# Patient Record
Sex: FEMALE | Race: WHITE | NOT HISPANIC OR LATINO | Employment: OTHER | URBAN - METROPOLITAN AREA
[De-identification: names, ages, dates, MRNs, and addresses within clinical notes are randomized per-mention and may not be internally consistent; named-entity substitution may affect disease eponyms.]

---

## 2018-06-14 ENCOUNTER — OFFICE VISIT (OUTPATIENT)
Dept: ORTHOPEDIC SURGERY | Facility: CLINIC | Age: 43
End: 2018-06-14

## 2018-06-14 VITALS
HEART RATE: 69 BPM | HEIGHT: 65 IN | SYSTOLIC BLOOD PRESSURE: 109 MMHG | WEIGHT: 160 LBS | BODY MASS INDEX: 26.66 KG/M2 | DIASTOLIC BLOOD PRESSURE: 77 MMHG

## 2018-06-14 DIAGNOSIS — M79.18 MYOFASCIAL PAIN: ICD-10-CM

## 2018-06-14 DIAGNOSIS — M54.2 CERVICALGIA: Primary | ICD-10-CM

## 2018-06-14 PROCEDURE — 99203 OFFICE O/P NEW LOW 30 MIN: CPT | Performed by: ORTHOPAEDIC SURGERY

## 2018-06-14 RX ORDER — OMEGA-3S/DHA/EPA/FISH OIL/D3 300MG-1000
CAPSULE ORAL
COMMUNITY
Start: 2018-06-01 | End: 2021-11-15

## 2018-06-14 RX ORDER — ERGOCALCIFEROL 1.25 MG/1
CAPSULE ORAL
COMMUNITY
Start: 2018-06-01

## 2018-06-14 RX ORDER — DICLOFENAC SODIUM 75 MG/1
75 TABLET, DELAYED RELEASE ORAL 2 TIMES DAILY
Qty: 30 TABLET | Refills: 0 | Status: SHIPPED | OUTPATIENT
Start: 2018-06-14 | End: 2021-11-15

## 2018-06-14 RX ORDER — ROPINIROLE 2 MG/1
TABLET, FILM COATED ORAL
COMMUNITY
Start: 2018-05-30

## 2018-06-14 RX ORDER — ATORVASTATIN CALCIUM 40 MG/1
TABLET, FILM COATED ORAL
COMMUNITY
Start: 2018-06-01 | End: 2021-11-15

## 2018-07-02 PROBLEM — M79.18 MYOFASCIAL PAIN: Status: ACTIVE | Noted: 2018-07-02

## 2021-11-15 ENCOUNTER — PATIENT ROUNDING (BHMG ONLY) (OUTPATIENT)
Dept: SURGERY | Facility: CLINIC | Age: 46
End: 2021-11-15

## 2021-11-15 ENCOUNTER — OFFICE VISIT (OUTPATIENT)
Dept: SURGERY | Facility: CLINIC | Age: 46
End: 2021-11-15

## 2021-11-15 VITALS
RESPIRATION RATE: 16 BRPM | HEART RATE: 72 BPM | DIASTOLIC BLOOD PRESSURE: 74 MMHG | BODY MASS INDEX: 26.66 KG/M2 | HEIGHT: 65 IN | SYSTOLIC BLOOD PRESSURE: 122 MMHG | WEIGHT: 160 LBS

## 2021-11-15 DIAGNOSIS — Z86.010 HISTORY OF COLON POLYPS: Primary | ICD-10-CM

## 2021-11-15 DIAGNOSIS — Z72.0 TOBACCO ABUSE: ICD-10-CM

## 2021-11-15 PROCEDURE — S0285 CNSLT BEFORE SCREEN COLONOSC: HCPCS | Performed by: SURGERY

## 2021-11-15 NOTE — PROGRESS NOTES
November 15, 2021    Hello, may I speak with Triny Alexandra?    My name is Sammie Alvarez      I am  with [facility]  [address] [place] [address] [phone].    Before we get started may I verify your date of birth? 1975    I am calling to officially welcome you to our practice and ask about your recent visit. Is this a good time to talk? yes    Tell me about your visit with us. What things went well?  Everything was fine.       We're always looking for ways to make our patients' experiences even better. Do you have recommendations on ways we may improve?  no    Overall were you satisfied with your first visit to our practice? yes       I appreciate you taking the time to speak with me today. Is there anything else I can do for you? no      Thank you, and have a great day.

## 2021-11-15 NOTE — PROGRESS NOTES
Triny Alexandra 46 y.o. female presents TO DISCUSS 5 YR REPEAT C-SCOPE.  + personal H/O polyps.    Chief Complaint   Patient presents with   • Colonoscopy             HPI     Above-noted agree.  Triny is known to my service.  She is here for her 5-year repeat colonoscopy.  She is doing very well.  She is tolerating a regular diet without nausea or vomiting.  She is moving her bowels well.  She has no complaints.  She is still smoking cigarettes.    Review of Systems   All other systems reviewed and are negative.            Current Outpatient Medications:   •  HYDROcodone-acetaminophen (NORCO) 7.5-325 MG per tablet, , Disp: , Rfl:   •  rOPINIRole (REQUIP) 2 MG tablet, , Disp: , Rfl:   •  vitamin D (ERGOCALCIFEROL) 05877 units capsule capsule, , Disp: , Rfl:         Allergies   Allergen Reactions   • Lyrica [Pregabalin] Shortness Of Breath   • Imitrex [Sumatriptan]    • Neurontin [Gabapentin]    • Pamelor [Nortriptyline Hcl]            Past Medical History:   Diagnosis Date   • Anxiety    • Cervicalgia    • Depression    • Hyperlipidemia    • Insomnia    • RLS (restless legs syndrome)            Past Surgical History:   Procedure Laterality Date   • COLONOSCOPY N/A 6/21/2016    Procedure: COLONOSCOPY; POLYPECTOMY;  Surgeon: Perla Garces DO;  Location: [facility];  Service:    • HYSTERECTOMY     • LAPAROSCOPIC CHOLECYSTECTOMY     • WRIST SURGERY             Social History     Tobacco Use   • Smoking status: Current Every Day Smoker     Packs/day: 1.00     Years: 15.00     Pack years: 15.00   • Smokeless tobacco: Not on file   Substance Use Topics   • Alcohol use: No   • Drug use: No           Immunization History   Administered Date(s) Administered   • COVID-19 ([employer]) 04/29/2021           Physical Exam  Vitals and nursing note reviewed.   Constitutional:       Appearance: Normal appearance.   HENT:      Head: Normocephalic and atraumatic.   Cardiovascular:      Rate and Rhythm: Normal rate and regular  "rhythm.   Pulmonary:      Effort: Pulmonary effort is normal.      Breath sounds: Normal breath sounds.   Abdominal:      General: Bowel sounds are normal.      Palpations: Abdomen is soft.   Musculoskeletal:         General: No swelling or tenderness.   Skin:     General: Skin is warm and dry.   Neurological:      General: No focal deficit present.      Mental Status: She is alert and oriented to person, place, and time.   Psychiatric:         Mood and Affect: Mood normal.         Debilities/Disabilities Identified: None    Emotional Behavior: Appropriate      /74   Pulse 72   Resp 16   Ht 165.1 cm (65\")   Wt 72.6 kg (160 lb)   BMI 26.63 kg/m²         Diagnoses and all orders for this visit:    1. History of colon polyps (Primary)    2. Tobacco abuse    I encouraged tobacco cessation.  We discussed the benefits and risks of performing endoscopy.  Benefits and risks were not limited to but including bleeding, infection, perforation, complications of anesthesia, aspiration.  Triny appeared to understand and is willing to proceed.    Thank you for allowing me to participate in the care of this interesting patient.        "

## 2021-11-15 NOTE — H&P
Triny Alexandra 46 y.o. female presents TO DISCUSS 5 YR REPEAT C-SCOPE.  + personal H/O polyps.    Chief Complaint   Patient presents with   • Colonoscopy             HPI     Above-noted agree.  Triny is known to my service.  She is here for her 5-year repeat colonoscopy.  She is doing very well.  She is tolerating a regular diet without nausea or vomiting.  She is moving her bowels well.  She has no complaints.  She is still smoking cigarettes.    Review of Systems   All other systems reviewed and are negative.            Current Outpatient Medications:   •  HYDROcodone-acetaminophen (NORCO) 7.5-325 MG per tablet, , Disp: , Rfl:   •  rOPINIRole (REQUIP) 2 MG tablet, , Disp: , Rfl:   •  vitamin D (ERGOCALCIFEROL) 69200 units capsule capsule, , Disp: , Rfl:         Allergies   Allergen Reactions   • Lyrica [Pregabalin] Shortness Of Breath   • Imitrex [Sumatriptan]    • Neurontin [Gabapentin]    • Pamelor [Nortriptyline Hcl]            Past Medical History:   Diagnosis Date   • Anxiety    • Cervicalgia    • Depression    • Hyperlipidemia    • Insomnia    • RLS (restless legs syndrome)            Past Surgical History:   Procedure Laterality Date   • COLONOSCOPY N/A 6/21/2016    Procedure: COLONOSCOPY; POLYPECTOMY;  Surgeon: Perla Garces DO;  Location: [facility];  Service:    • HYSTERECTOMY     • LAPAROSCOPIC CHOLECYSTECTOMY     • WRIST SURGERY             Social History     Tobacco Use   • Smoking status: Current Every Day Smoker     Packs/day: 1.00     Years: 15.00     Pack years: 15.00   • Smokeless tobacco: Not on file   Substance Use Topics   • Alcohol use: No   • Drug use: No           Immunization History   Administered Date(s) Administered   • COVID-19 ([employer]) 04/29/2021           Physical Exam  Vitals and nursing note reviewed.   Constitutional:       Appearance: Normal appearance.   HENT:      Head: Normocephalic and atraumatic.   Cardiovascular:      Rate and Rhythm: Normal rate and regular  "rhythm.   Pulmonary:      Effort: Pulmonary effort is normal.      Breath sounds: Normal breath sounds.   Abdominal:      General: Bowel sounds are normal.      Palpations: Abdomen is soft.   Musculoskeletal:         General: No swelling or tenderness.   Skin:     General: Skin is warm and dry.   Neurological:      General: No focal deficit present.      Mental Status: She is alert and oriented to person, place, and time.   Psychiatric:         Mood and Affect: Mood normal.         Debilities/Disabilities Identified: None    Emotional Behavior: Appropriate      /74   Pulse 72   Resp 16   Ht 165.1 cm (65\")   Wt 72.6 kg (160 lb)   BMI 26.63 kg/m²         Diagnoses and all orders for this visit:    1. History of colon polyps (Primary)    2. Tobacco abuse    I encouraged tobacco cessation.  We discussed the benefits and risks of performing endoscopy.  Benefits and risks were not limited to but including bleeding, infection, perforation, complications of anesthesia, aspiration.  Triny appeared to understand and is willing to proceed.    Thank you for allowing me to participate in the care of this interesting patient.            "

## 2021-11-16 PROBLEM — Z72.0 TOBACCO ABUSE: Status: ACTIVE | Noted: 2021-11-16

## 2021-11-16 PROBLEM — Z86.010 HISTORY OF COLON POLYPS: Status: ACTIVE | Noted: 2021-11-16

## 2021-12-09 DIAGNOSIS — Z01.818 PRE-OP TESTING: Primary | ICD-10-CM

## 2021-12-11 ENCOUNTER — LAB (OUTPATIENT)
Dept: LAB | Facility: HOSPITAL | Age: 46
End: 2021-12-11

## 2021-12-11 DIAGNOSIS — Z01.818 PRE-OP TESTING: ICD-10-CM

## 2021-12-11 LAB — SARS-COV-2 RNA PNL SPEC NAA+PROBE: NOT DETECTED

## 2021-12-11 PROCEDURE — C9803 HOPD COVID-19 SPEC COLLECT: HCPCS

## 2021-12-11 PROCEDURE — 87635 SARS-COV-2 COVID-19 AMP PRB: CPT | Performed by: SURGERY

## 2021-12-13 ENCOUNTER — ANESTHESIA EVENT (OUTPATIENT)
Dept: PERIOP | Facility: HOSPITAL | Age: 46
End: 2021-12-13

## 2021-12-14 ENCOUNTER — HOSPITAL ENCOUNTER (OUTPATIENT)
Facility: HOSPITAL | Age: 46
Setting detail: HOSPITAL OUTPATIENT SURGERY
Discharge: HOME | End: 2021-12-14
Attending: SURGERY | Admitting: SURGERY

## 2021-12-14 ENCOUNTER — ANESTHESIA (OUTPATIENT)
Dept: PERIOP | Facility: HOSPITAL | Age: 46
End: 2021-12-14

## 2021-12-14 VITALS
SYSTOLIC BLOOD PRESSURE: 152 MMHG | RESPIRATION RATE: 16 BRPM | TEMPERATURE: 98.3 F | BODY MASS INDEX: 27.92 KG/M2 | OXYGEN SATURATION: 97 % | WEIGHT: 167.8 LBS | DIASTOLIC BLOOD PRESSURE: 98 MMHG | HEART RATE: 70 BPM

## 2021-12-14 DIAGNOSIS — Z72.0 TOBACCO ABUSE: ICD-10-CM

## 2021-12-14 DIAGNOSIS — Z86.010 HISTORY OF COLON POLYPS: ICD-10-CM

## 2021-12-14 PROCEDURE — 88305 TISSUE EXAM BY PATHOLOGIST: CPT | Performed by: SURGERY

## 2021-12-14 PROCEDURE — 45380 COLONOSCOPY AND BIOPSY: CPT | Performed by: SURGERY

## 2021-12-14 PROCEDURE — 25010000002 PROPOFOL 10 MG/ML EMULSION: Performed by: REGISTERED NURSE

## 2021-12-14 RX ORDER — ONDANSETRON 2 MG/ML
4 INJECTION INTRAMUSCULAR; INTRAVENOUS ONCE AS NEEDED
Status: DISCONTINUED | OUTPATIENT
Start: 2021-12-14 | End: 2021-12-14 | Stop reason: HOSPADM

## 2021-12-14 RX ORDER — SODIUM CHLORIDE, SODIUM LACTATE, POTASSIUM CHLORIDE, CALCIUM CHLORIDE 600; 310; 30; 20 MG/100ML; MG/100ML; MG/100ML; MG/100ML
100 INJECTION, SOLUTION INTRAVENOUS CONTINUOUS
Status: DISCONTINUED | OUTPATIENT
Start: 2021-12-14 | End: 2021-12-14 | Stop reason: HOSPADM

## 2021-12-14 RX ORDER — LIDOCAINE HYDROCHLORIDE 20 MG/ML
INJECTION, SOLUTION INFILTRATION; PERINEURAL AS NEEDED
Status: DISCONTINUED | OUTPATIENT
Start: 2021-12-14 | End: 2021-12-14 | Stop reason: SURG

## 2021-12-14 RX ORDER — LIDOCAINE HYDROCHLORIDE 10 MG/ML
0.5 INJECTION, SOLUTION EPIDURAL; INFILTRATION; INTRACAUDAL; PERINEURAL ONCE AS NEEDED
Status: DISCONTINUED | OUTPATIENT
Start: 2021-12-14 | End: 2021-12-14 | Stop reason: HOSPADM

## 2021-12-14 RX ORDER — SODIUM CHLORIDE 0.9 % (FLUSH) 0.9 %
10 SYRINGE (ML) INJECTION EVERY 12 HOURS SCHEDULED
Status: DISCONTINUED | OUTPATIENT
Start: 2021-12-14 | End: 2021-12-14 | Stop reason: HOSPADM

## 2021-12-14 RX ORDER — SODIUM CHLORIDE, SODIUM LACTATE, POTASSIUM CHLORIDE, CALCIUM CHLORIDE 600; 310; 30; 20 MG/100ML; MG/100ML; MG/100ML; MG/100ML
9 INJECTION, SOLUTION INTRAVENOUS CONTINUOUS
Status: DISCONTINUED | OUTPATIENT
Start: 2021-12-14 | End: 2021-12-14 | Stop reason: HOSPADM

## 2021-12-14 RX ORDER — DEXMEDETOMIDINE HYDROCHLORIDE 100 UG/ML
INJECTION, SOLUTION INTRAVENOUS AS NEEDED
Status: DISCONTINUED | OUTPATIENT
Start: 2021-12-14 | End: 2021-12-14 | Stop reason: SURG

## 2021-12-14 RX ORDER — SODIUM CHLORIDE 0.9 % (FLUSH) 0.9 %
10 SYRINGE (ML) INJECTION AS NEEDED
Status: DISCONTINUED | OUTPATIENT
Start: 2021-12-14 | End: 2021-12-14 | Stop reason: HOSPADM

## 2021-12-14 RX ORDER — SODIUM CHLORIDE 9 MG/ML
40 INJECTION, SOLUTION INTRAVENOUS AS NEEDED
Status: DISCONTINUED | OUTPATIENT
Start: 2021-12-14 | End: 2021-12-14 | Stop reason: HOSPADM

## 2021-12-14 RX ORDER — PROPOFOL 10 MG/ML
VIAL (ML) INTRAVENOUS AS NEEDED
Status: DISCONTINUED | OUTPATIENT
Start: 2021-12-14 | End: 2021-12-14 | Stop reason: SURG

## 2021-12-14 RX ORDER — MAGNESIUM HYDROXIDE 1200 MG/15ML
LIQUID ORAL AS NEEDED
Status: DISCONTINUED | OUTPATIENT
Start: 2021-12-14 | End: 2021-12-14 | Stop reason: HOSPADM

## 2021-12-14 RX ADMIN — PROPOFOL 100 MCG/KG/MIN: 10 INJECTION, EMULSION INTRAVENOUS at 09:37

## 2021-12-14 RX ADMIN — DEXMEDETOMIDINE 4 MCG: 100 INJECTION, SOLUTION, CONCENTRATE INTRAVENOUS at 09:44

## 2021-12-14 RX ADMIN — DEXMEDETOMIDINE 4 MCG: 100 INJECTION, SOLUTION, CONCENTRATE INTRAVENOUS at 09:37

## 2021-12-14 RX ADMIN — SODIUM CHLORIDE, POTASSIUM CHLORIDE, SODIUM LACTATE AND CALCIUM CHLORIDE 9 ML/HR: 600; 310; 30; 20 INJECTION, SOLUTION INTRAVENOUS at 08:50

## 2021-12-14 RX ADMIN — LIDOCAINE HYDROCHLORIDE 50 MG: 20 INJECTION, SOLUTION INFILTRATION; PERINEURAL at 09:37

## 2021-12-14 NOTE — ANESTHESIA PREPROCEDURE EVALUATION
Anesthesia Evaluation     Patient summary reviewed and Nursing notes reviewed   no history of anesthetic complications:  NPO Solid Status: > 8 hours  NPO Liquid Status: > 8 hours           Airway   Mallampati: I  TM distance: >3 FB  Neck ROM: full  No difficulty expected  Dental - normal exam     Pulmonary - normal exam    breath sounds clear to auscultation  (+) a smoker (up to one pack per day) Current Smoked day of surgery,   Cardiovascular - normal exam  Exercise tolerance: good (4-7 METS)    Rhythm: regular  Rate: normal        Neuro/Psych  (+) headaches (migraines),       ROS Comment: RLS, also toes draw up  GI/Hepatic/Renal/Endo    (+)   renal disease (remotely) stones,   PUD: occ.    Musculoskeletal     (+) neck pain (with pain into left shoulder), radiculopathy Left upper extremity  Abdominal  - normal exam   Substance History - negative use     OB/GYN negative ob/gyn ROS         Other - negative ROS                       Anesthesia Plan    ASA 2     MAC     intravenous induction     Anesthetic plan, all risks, benefits, and alternatives have been provided, discussed and informed consent has been obtained with: patient and sibling.

## 2021-12-14 NOTE — OP NOTE
Colonoscopy Procedure Note      Pre-operative Diagnosis: History of colon polyps    Post-operative Diagnosis: Rectal polyps    Procedure: Colonoscopy with polypectomy using cold forceps    Surgeon: Mili    Anesthetic: Joey Mantilla CRNA    Estimated Blood Loss: Minimal    Complications: None    Indications: History of colon polyps    Findings/Treatments:   Rectal polyps x6-removed with cold forceps       Scope Withdrawal Time:  6 minutes      Recommendations: Await pathology      Procedure Details     After discussing the benefits and risks of the procedure with the patient, not limited to but including:  Bleeding, infection, perforation, aspiration; informed consent was signed.  The patient was taken into the endoscopy room at [facility] and placed in the left lateral decubitus position.  MAC anesthesia was given with appropriate cardiopulmonary monitoring.  A rectal exam was performed.  Sphincter tone was normal.  The colonoscope was then inserted and carefully advanced to the cecum while visualizing the mucosa.  The cecum was identified by the ileocecal valve and the orifice of the appendix.  Once in the cecum, the scope was slowly withdrawn while carefully evaluating the mucosa deflating air.  Once in the rectum there were multiple rectal polyps removed cold forceps.  The scope was then retroflexed and straightened and removed.  Triny was then taken to the recovery area in stable postoperative condition having tolerated her procedure well.    Perla Garces DO

## 2021-12-14 NOTE — H&P
Triny Alexandra 46 y.o. female presents TO DISCUSS 5 YR REPEAT C-SCOPE.  + personal H/O polyps.    Chief Complaint   Patient presents with   • Colonoscopy             HPI     Above-noted agree.  Triny is known to my service.  She is here for her 5-year repeat colonoscopy.  She is doing very well.  She is tolerating a regular diet without nausea or vomiting.  She is moving her bowels well.  She has no complaints.  She is still smoking cigarettes.    Review of Systems   All other systems reviewed and are negative.            Current Outpatient Medications:   •  HYDROcodone-acetaminophen (NORCO) 7.5-325 MG per tablet, , Disp: , Rfl:   •  rOPINIRole (REQUIP) 2 MG tablet, , Disp: , Rfl:   •  vitamin D (ERGOCALCIFEROL) 06573 units capsule capsule, , Disp: , Rfl:         Allergies   Allergen Reactions   • Lyrica [Pregabalin] Shortness Of Breath   • Imitrex [Sumatriptan]    • Neurontin [Gabapentin]    • Pamelor [Nortriptyline Hcl]            Past Medical History:   Diagnosis Date   • Anxiety    • Cervicalgia    • Depression    • Hyperlipidemia    • Insomnia    • RLS (restless legs syndrome)            Past Surgical History:   Procedure Laterality Date   • COLONOSCOPY N/A 6/21/2016    Procedure: COLONOSCOPY; POLYPECTOMY;  Surgeon: Perla Garces DO;  Location: [facility];  Service:    • HYSTERECTOMY     • LAPAROSCOPIC CHOLECYSTECTOMY     • WRIST SURGERY             Social History     Tobacco Use   • Smoking status: Current Every Day Smoker     Packs/day: 1.00     Years: 15.00     Pack years: 15.00   • Smokeless tobacco: Not on file   Substance Use Topics   • Alcohol use: No   • Drug use: No           Immunization History   Administered Date(s) Administered   • COVID-19 ([employer]) 04/29/2021           Physical Exam  Vitals and nursing note reviewed.   Constitutional:       Appearance: Normal appearance.   HENT:      Head: Normocephalic and atraumatic.   Cardiovascular:      Rate and Rhythm: Normal rate and regular  "rhythm.   Pulmonary:      Effort: Pulmonary effort is normal.      Breath sounds: Normal breath sounds.   Abdominal:      General: Bowel sounds are normal.      Palpations: Abdomen is soft.   Musculoskeletal:         General: No swelling or tenderness.   Skin:     General: Skin is warm and dry.   Neurological:      General: No focal deficit present.      Mental Status: She is alert and oriented to person, place, and time.   Psychiatric:         Mood and Affect: Mood normal.         Debilities/Disabilities Identified: None    Emotional Behavior: Appropriate      /74   Pulse 72   Resp 16   Ht 165.1 cm (65\")   Wt 72.6 kg (160 lb)   BMI 26.63 kg/m²         Diagnoses and all orders for this visit:    1. History of colon polyps (Primary)    2. Tobacco abuse    I encouraged tobacco cessation.  We discussed the benefits and risks of performing endoscopy.  Benefits and risks were not limited to but including bleeding, infection, perforation, complications of anesthesia, aspiration.  Triny appeared to understand and is willing to proceed.    Thank you for allowing me to participate in the care of this interesting patient.            "

## 2021-12-14 NOTE — ANESTHESIA POSTPROCEDURE EVALUATION
Patient: Triny Alexandra    Procedure Summary     Date: 12/14/21 Room / Location: [facility] ENDOSCOPY 1 /  [facility]    Anesthesia Start: 0934 Anesthesia Stop:     Procedure: COLONOSCOPY with polypectomy (N/A ) Diagnosis:       History of colon polyps      Tobacco abuse      (History of colon polyps [Z86.010])      (Tobacco abuse [Z72.0])    Surgeons: Perla Garces DO Provider: Joey Garrison CRNA    Anesthesia Type: MAC ASA Status: 2          Anesthesia Type: MAC    Vitals  Vitals Value Taken Time   /98 12/14/21 1030   Temp 98.3 °F (36.8 °C) 12/14/21 1010   Pulse 70 12/14/21 1030   Resp 16 12/14/21 1030   SpO2 97 % 12/14/21 1030           Post Anesthesia Care and Evaluation    Patient location during evaluation: PHASE II  Patient participation: complete - patient participated  Level of consciousness: awake  Pain score: 0  Pain management: adequate  Airway patency: patent  Anesthetic complications: No anesthetic complications  PONV Status: none  Cardiovascular status: acceptable  Hydration status: acceptable

## 2021-12-15 ENCOUNTER — HOSPITAL ENCOUNTER (EMERGENCY)
Facility: HOSPITAL | Age: 46
Discharge: HOME | End: 2021-12-16
Attending: EMERGENCY MEDICINE | Admitting: EMERGENCY MEDICINE

## 2021-12-15 ENCOUNTER — APPOINTMENT (OUTPATIENT)
Dept: CT IMAGING | Facility: HOSPITAL | Age: 46
End: 2021-12-15

## 2021-12-15 DIAGNOSIS — R10.32 LEFT LOWER QUADRANT ABDOMINAL PAIN: Primary | ICD-10-CM

## 2021-12-15 LAB
ALBUMIN SERPL-MCNC: 3.6 G/DL (ref 3.5–5.2)
ALBUMIN/GLOB SERPL: 1.3 G/DL
ALP SERPL-CCNC: 94 U/L (ref 39–117)
ALT SERPL W P-5'-P-CCNC: 25 U/L (ref 1–33)
ANION GAP SERPL CALCULATED.3IONS-SCNC: 9.4 MMOL/L (ref 5–15)
AST SERPL-CCNC: 19 U/L (ref 1–32)
BASOPHILS # BLD AUTO: 0.05 10*3/MM3 (ref 0–0.2)
BASOPHILS NFR BLD AUTO: 0.7 % (ref 0–1.5)
BILIRUB SERPL-MCNC: 0.2 MG/DL (ref 0–1.2)
BILIRUB UR QL STRIP: NEGATIVE
BUN SERPL-MCNC: 13 MG/DL (ref 6–20)
BUN/CREAT SERPL: 21.3 (ref 7–25)
CALCIUM SPEC-SCNC: 8.9 MG/DL (ref 8.6–10.5)
CHLORIDE SERPL-SCNC: 104 MMOL/L (ref 98–107)
CLARITY UR: ABNORMAL
CO2 SERPL-SCNC: 23.6 MMOL/L (ref 22–29)
COLOR UR: YELLOW
CREAT SERPL-MCNC: 0.61 MG/DL (ref 0.57–1)
DEPRECATED RDW RBC AUTO: 44.1 FL (ref 37–54)
EOSINOPHIL # BLD AUTO: 0.13 10*3/MM3 (ref 0–0.4)
EOSINOPHIL NFR BLD AUTO: 1.7 % (ref 0.3–6.2)
ERYTHROCYTE [DISTWIDTH] IN BLOOD BY AUTOMATED COUNT: 13.4 % (ref 12.3–15.4)
GFR SERPL CREATININE-BSD FRML MDRD: 105 ML/MIN/1.73
GLOBULIN UR ELPH-MCNC: 2.7 GM/DL
GLUCOSE SERPL-MCNC: 83 MG/DL (ref 65–99)
GLUCOSE UR STRIP-MCNC: NEGATIVE MG/DL
HCT VFR BLD AUTO: 38 % (ref 34–46.6)
HGB BLD-MCNC: 12.7 G/DL (ref 12–15.9)
HGB UR QL STRIP.AUTO: NEGATIVE
IMM GRANULOCYTES # BLD AUTO: 0.02 10*3/MM3 (ref 0–0.05)
IMM GRANULOCYTES NFR BLD AUTO: 0.3 % (ref 0–0.5)
KETONES UR QL STRIP: NEGATIVE
LAB AP CASE REPORT: NORMAL
LEUKOCYTE ESTERASE UR QL STRIP.AUTO: NEGATIVE
LYMPHOCYTES # BLD AUTO: 2.93 10*3/MM3 (ref 0.7–3.1)
LYMPHOCYTES NFR BLD AUTO: 38.8 % (ref 19.6–45.3)
MCH RBC QN AUTO: 30.2 PG (ref 26.6–33)
MCHC RBC AUTO-ENTMCNC: 33.4 G/DL (ref 31.5–35.7)
MCV RBC AUTO: 90.3 FL (ref 79–97)
MONOCYTES # BLD AUTO: 0.51 10*3/MM3 (ref 0.1–0.9)
MONOCYTES NFR BLD AUTO: 6.7 % (ref 5–12)
NEUTROPHILS NFR BLD AUTO: 3.92 10*3/MM3 (ref 1.7–7)
NEUTROPHILS NFR BLD AUTO: 51.8 % (ref 42.7–76)
NITRITE UR QL STRIP: NEGATIVE
NRBC BLD AUTO-RTO: 0 /100 WBC (ref 0–0.2)
PATH REPORT.FINAL DX SPEC: NORMAL
PATH REPORT.GROSS SPEC: NORMAL
PH UR STRIP.AUTO: 7 [PH] (ref 4.5–8)
PLATELET # BLD AUTO: 204 10*3/MM3 (ref 140–450)
PMV BLD AUTO: 11.7 FL (ref 6–12)
POTASSIUM SERPL-SCNC: 3.8 MMOL/L (ref 3.5–5.2)
PROT SERPL-MCNC: 6.3 G/DL (ref 6–8.5)
PROT UR QL STRIP: NEGATIVE
RBC # BLD AUTO: 4.21 10*6/MM3 (ref 3.77–5.28)
SODIUM SERPL-SCNC: 137 MMOL/L (ref 136–145)
SP GR UR STRIP: 1.02 (ref 1–1.03)
UROBILINOGEN UR QL STRIP: ABNORMAL
WBC NRBC COR # BLD: 7.56 10*3/MM3 (ref 3.4–10.8)

## 2021-12-15 PROCEDURE — 96375 TX/PRO/DX INJ NEW DRUG ADDON: CPT

## 2021-12-15 PROCEDURE — 25010000002 FENTANYL CITRATE (PF) 50 MCG/ML SOLUTION: Performed by: EMERGENCY MEDICINE

## 2021-12-15 PROCEDURE — 96374 THER/PROPH/DIAG INJ IV PUSH: CPT

## 2021-12-15 PROCEDURE — 85025 COMPLETE CBC W/AUTO DIFF WBC: CPT | Performed by: EMERGENCY MEDICINE

## 2021-12-15 PROCEDURE — 74177 CT ABD & PELVIS W/CONTRAST: CPT

## 2021-12-15 PROCEDURE — 0 IOPAMIDOL PER 1 ML: Performed by: EMERGENCY MEDICINE

## 2021-12-15 PROCEDURE — 99282 EMERGENCY DEPT VISIT SF MDM: CPT | Performed by: EMERGENCY MEDICINE

## 2021-12-15 PROCEDURE — 81003 URINALYSIS AUTO W/O SCOPE: CPT | Performed by: EMERGENCY MEDICINE

## 2021-12-15 PROCEDURE — 80053 COMPREHEN METABOLIC PANEL: CPT | Performed by: EMERGENCY MEDICINE

## 2021-12-15 PROCEDURE — 25010000002 ONDANSETRON PER 1 MG: Performed by: EMERGENCY MEDICINE

## 2021-12-15 PROCEDURE — 99283 EMERGENCY DEPT VISIT LOW MDM: CPT

## 2021-12-15 RX ORDER — SODIUM CHLORIDE 0.9 % (FLUSH) 0.9 %
10 SYRINGE (ML) INJECTION AS NEEDED
Status: DISCONTINUED | OUTPATIENT
Start: 2021-12-15 | End: 2021-12-16 | Stop reason: HOSPADM

## 2021-12-15 RX ORDER — ONDANSETRON 2 MG/ML
4 INJECTION INTRAMUSCULAR; INTRAVENOUS ONCE
Status: COMPLETED | OUTPATIENT
Start: 2021-12-15 | End: 2021-12-15

## 2021-12-15 RX ORDER — CYCLOBENZAPRINE HCL 10 MG
10 TABLET ORAL 3 TIMES DAILY PRN
COMMUNITY
Start: 2021-11-16

## 2021-12-15 RX ORDER — FENTANYL CITRATE 50 UG/ML
25 INJECTION, SOLUTION INTRAMUSCULAR; INTRAVENOUS ONCE
Status: COMPLETED | OUTPATIENT
Start: 2021-12-15 | End: 2021-12-15

## 2021-12-15 RX ADMIN — FENTANYL CITRATE 25 MCG: 50 INJECTION INTRAMUSCULAR; INTRAVENOUS at 21:50

## 2021-12-15 RX ADMIN — IOPAMIDOL 50 ML: 755 INJECTION, SOLUTION INTRAVENOUS at 22:53

## 2021-12-15 RX ADMIN — ONDANSETRON 4 MG: 2 INJECTION INTRAMUSCULAR; INTRAVENOUS at 21:49

## 2021-12-15 RX ADMIN — SODIUM CHLORIDE, PRESERVATIVE FREE 10 ML: 5 INJECTION INTRAVENOUS at 21:03

## 2021-12-16 VITALS
OXYGEN SATURATION: 98 % | SYSTOLIC BLOOD PRESSURE: 141 MMHG | RESPIRATION RATE: 16 BRPM | TEMPERATURE: 98.2 F | WEIGHT: 168 LBS | DIASTOLIC BLOOD PRESSURE: 84 MMHG | HEART RATE: 70 BPM | HEIGHT: 65 IN | BODY MASS INDEX: 27.99 KG/M2

## 2021-12-16 NOTE — ED NOTES
I wore full protective equipment throughout this patient encounter. Hand hygiene was performed before donning protective equipment and after removal when leaving the room.       Tiffany Coombs RN  12/15/21 9074

## 2021-12-16 NOTE — ED PROVIDER NOTES
Subjective   History of Present Illness  History of Present Illness    Chief complaint: Abdominal pain    Location: Left lower quadrant, nonradiating    Quality/Severity: 6/10 at its worst, 6/10 currently, sharp    Timing/Onset/Duration: Started today    Modifying Factors: It is better when she presses and on it, worse when she lets up    Associated Symptoms: No headache.  No fever chills or cough.  No sore throat earache or nasal congestion.  No chest pain or shortness of breath.  No diarrhea.  The patient's has nausea but no vomiting.  The patient is passing gas.  The patient has not had a bowel movement since her colonoscopy.    Narrative: This 46-year-old white female had a colonoscopy by Dr. Delgado not yesterday.  She presents with left lower quadrant abdominal pain.  She states that there were 6 polyps removed.  The patient has a history of colon polyps removed 5 years ago that were benign.  The patient has had a left oophorectomy and hysterectomy.    PCP:Jose Martin      Review of Systems   Constitutional: Negative for chills and fever.   HENT: Negative for congestion, ear pain and sore throat.    Respiratory: Negative for shortness of breath.    Cardiovascular: Negative for chest pain.   Gastrointestinal: Positive for abdominal pain and nausea. Negative for diarrhea and vomiting.   Genitourinary: Negative for dysuria.   Skin: Negative for rash.   Neurological: Negative for headaches.        Medication List      ASK your doctor about these medications    HYDROcodone-acetaminophen 7.5-325 MG per tablet  Commonly known as: NORCO     rOPINIRole 2 MG tablet  Commonly known as: REQUIP     vitamin D 1.25 MG (36079 UT) capsule capsule  Commonly known as: ERGOCALCIFEROL            Past Medical History:   Diagnosis Date   • Anxiety    • Cervicalgia    • Depression    • Hyperlipidemia    • Insomnia    • RLS (restless legs syndrome)        Allergies   Allergen Reactions   • Lyrica [Pregabalin] Shortness Of Breath   • Imitrex  [Sumatriptan]    • Neurontin [Gabapentin]    • Pamelor [Nortriptyline Hcl]        Past Surgical History:   Procedure Laterality Date   • COLONOSCOPY N/A 6/21/2016    Procedure: COLONOSCOPY; POLYPECTOMY;  Surgeon: Perla Garces DO;  Location: [facility] OR;  Service:    • COLONOSCOPY W/ POLYPECTOMY N/A 12/14/2021    Procedure: COLONOSCOPY with polypectomy;  Surgeon: Perla aGrces DO;  Location: [facility] OR;  Service: Gastroenterology;  Laterality: N/A;  rectal polyp x6   • HYSTERECTOMY     • LAPAROSCOPIC CHOLECYSTECTOMY     • WRIST SURGERY         Family History   Problem Relation Age of Onset   • Heart attack Father    • Thyroid disease Sister        Social History     Socioeconomic History   • Marital status: Legally    Tobacco Use   • Smoking status: Current Every Day Smoker     Packs/day: 1.00     Years: 15.00     Pack years: 15.00   • Smokeless tobacco: Never Used   Vaping Use   • Vaping Use: Never used   Substance and Sexual Activity   • Alcohol use: No   • Drug use: No   • Sexual activity: Defer           Objective   Physical Exam  Vitals reviewed: The temperature is 98.2 °F.  The pulse is 87.  The respirations 18.  The blood pressure 167/87, the room air pulse ox 98%.   Constitutional:       Appearance: She is well-developed.   HENT:      Head: Normocephalic and atraumatic.      Mouth/Throat:      Mouth: Mucous membranes are moist.   Cardiovascular:      Rate and Rhythm: Normal rate and regular rhythm.      Heart sounds: No murmur heard.  No friction rub. No gallop.    Pulmonary:      Effort: Pulmonary effort is normal.      Breath sounds: Normal breath sounds.   Abdominal:      General: Abdomen is flat. Bowel sounds are normal.      Palpations: Abdomen is soft. There is no mass.      Tenderness: There is abdominal tenderness (Moderate left lower quadrant tenderness). There is rebound. There is no guarding.      Hernia: No hernia is present.   Skin:     General: Skin is warm and dry.    Neurological:      General: No focal deficit present.      Mental Status: She is alert and oriented to person, place, and time.         Procedures           ED Course  ED Course as of 12/15/21 2335   Wed Dec 15, 2021   2140 The laboratory values were reviewed by me.  They are unremarkable [RC]      ED Course User Index  [RC] Edward Bal MD           00:27 EST, 12/16/21:  The patient was reassessed.  She states that she feels better her vital signs were reviewed and are stable.  Abdominal exam: Soft, moderate left lower quadrant tenderness, no rebound, no guarding, no masses, positive bowel sounds.  The laboratory values and CT does not show anything acutely concerning    00:28 EST, 12/16/21:  The patient's diagnosis of left lower quad abdominal pain was discussed with her.  Patient should call Dr. Garces in the morning for a follow-up on Friday or Monday.  The patient should continue her Norco as needed as directed for pain.  She should take Colace as needed as directed for constipation if she is taking the Norco for pain.  The patient should return to the emergency department if there is worsening pain, vomiting, bloody, dark, or tarry stools, fever, chills, worse in any way at all.  All the patient's questions were answered she will be discharged in good condition.                                      MDM    Final diagnoses:   Left lower quadrant abdominal pain       ED Disposition  ED Disposition     None          No follow-up provider specified.       Medication List      No changes were made to your prescriptions during this visit.          Edward Bal MD  12/16/21 0031

## 2021-12-16 NOTE — DISCHARGE INSTRUCTIONS
Take the Norco as needed as directed for pain.  Take Colace as needed as directed for constipation if you are taking the [place] for pain.  Call  in the morning for a follow-up appointment on Friday or Monday return to the emergency department if there is worsening pain, fever, chills, vomiting, bloody, black, or tarry stools, worse in any way at all

## 2021-12-20 ENCOUNTER — TELEPHONE (OUTPATIENT)
Dept: SURGERY | Facility: CLINIC | Age: 46
End: 2021-12-20

## 2021-12-20 NOTE — TELEPHONE ENCOUNTER
Phone call to pt as FU from ER.  Pt states she is feeling better.  Pain has resolved.  + food/fld intake, + bowel/bladder func w/o diff.  Pt states she can not come to office today due to quarantine for COVID.  Pt will call PRN.

## 2022-03-22 ENCOUNTER — HOSPITAL ENCOUNTER (EMERGENCY)
Facility: HOSPITAL | Age: 47
Discharge: HOME | End: 2022-03-22
Attending: EMERGENCY MEDICINE | Admitting: EMERGENCY MEDICINE

## 2022-03-22 VITALS
SYSTOLIC BLOOD PRESSURE: 137 MMHG | BODY MASS INDEX: 27.16 KG/M2 | WEIGHT: 163 LBS | HEART RATE: 90 BPM | RESPIRATION RATE: 16 BRPM | TEMPERATURE: 97.9 F | HEIGHT: 65 IN | DIASTOLIC BLOOD PRESSURE: 95 MMHG | OXYGEN SATURATION: 96 %

## 2022-03-22 DIAGNOSIS — J03.90 TONSILLITIS WITH EXUDATE: Primary | ICD-10-CM

## 2022-03-22 PROCEDURE — 63710000001 PREDNISONE PER 1 MG: Performed by: EMERGENCY MEDICINE

## 2022-03-22 PROCEDURE — 99283 EMERGENCY DEPT VISIT LOW MDM: CPT

## 2022-03-22 PROCEDURE — 99282 EMERGENCY DEPT VISIT SF MDM: CPT | Performed by: EMERGENCY MEDICINE

## 2022-03-22 RX ORDER — PREDNISONE 20 MG/1
20 TABLET ORAL 3 TIMES DAILY
Qty: 9 TABLET | Refills: 0 | Status: SHIPPED | OUTPATIENT
Start: 2022-03-22

## 2022-03-22 RX ORDER — PREDNISONE 20 MG/1
60 TABLET ORAL ONCE
Status: COMPLETED | OUTPATIENT
Start: 2022-03-22 | End: 2022-03-22

## 2022-03-22 RX ADMIN — PREDNISONE 60 MG: 20 TABLET ORAL at 17:49

## 2022-03-22 NOTE — ED PROVIDER NOTES
EMERGENCY DEPARTMENT ENCOUNTER      Room Number: 05/05      HPI:    Chief complaint: Sore throat and sent here for possible peritonsillar abscess    Location: Bilateral throat pain and worse on the right    Quality/Severity: Moderate    Timing/Duration: Sore throat for several days and longstanding history of tonsillar hypertrophy    Modifying Factors: Swallowing makes pain worse    Associated Symptoms: Anterior neck pain    Narrative: Pt is a 46 y.o. female who presents complaining of a sore throat and possible peritonsillar abscess.  Patient states that the symptoms started several days ago and has been told that she has chronic enlargement of her tonsils.  History of strep in the past, but none recently.  Patient was seen by primary care and given a shot of antibiotics (?  Rocephin) in the office.  Patient was also discharged on an oral antibiotic that she takes once a day and is thought to be Levaquin.  Patient states that she is not feeling significant better since starting antibiotics.  Apparently primary care had some concern about a peritonsillar abscess and sent the patient here for further evaluation.  Patient denies fever, voice change, trismus and other upper respiratory symptoms.  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      PMD: Fabian Philippe MD    REVIEW OF SYSTEMS  Review of Systems   Constitutional: Positive for appetite change (Due to current throat pain). Negative for activity change, fatigue and fever.   HENT: Positive for sore throat and trouble swallowing (Painful). Negative for congestion and voice change.    Respiratory: Negative for cough and shortness of breath.    Cardiovascular: Negative for chest pain.   Gastrointestinal: Negative for abdominal pain.   Musculoskeletal: Positive for neck pain (Anterior currently and posteriorly chronic).   Skin: Negative for rash.   Neurological: Negative for headaches.   Psychiatric/Behavioral: Negative for confusion.   All other systems reviewed and are  negative.      PAST MEDICAL HISTORY  Active Ambulatory Problems     Diagnosis Date Noted   • Cervicalgia 06/14/2018   • Myofascial pain 07/02/2018   • History of colon polyps 11/16/2021   • Tobacco abuse 11/16/2021     Resolved Ambulatory Problems     Diagnosis Date Noted   • No Resolved Ambulatory Problems     Past Medical History:   Diagnosis Date   • Anxiety    • Depression    • Hyperlipidemia    • Insomnia    • RLS (restless legs syndrome)        PAST SURGICAL HISTORY  Past Surgical History:   Procedure Laterality Date   • COLONOSCOPY N/A 6/21/2016    Procedure: COLONOSCOPY; POLYPECTOMY;  Surgeon: Perla Garces DO;  Location: [facility] OR;  Service:    • COLONOSCOPY W/ POLYPECTOMY N/A 12/14/2021    Procedure: COLONOSCOPY with polypectomy;  Surgeon: Perla Garces DO;  Location: [facility] OR;  Service: Gastroenterology;  Laterality: N/A;  rectal polyp x6   • HYSTERECTOMY     • LAPAROSCOPIC CHOLECYSTECTOMY     • WRIST SURGERY         FAMILY HISTORY  Family History   Problem Relation Age of Onset   • Heart attack Father    • Thyroid disease Sister        SOCIAL HISTORY  Social History     Socioeconomic History   • Marital status: Legally    Tobacco Use   • Smoking status: Current Every Day Smoker     Packs/day: 1.00     Years: 15.00     Pack years: 15.00   • Smokeless tobacco: Never Used   Vaping Use   • Vaping Use: Never used   Substance and Sexual Activity   • Alcohol use: No   • Drug use: No   • Sexual activity: Defer       ALLERGIES  Lyrica [pregabalin], Imitrex [sumatriptan], Neurontin [gabapentin], and Pamelor [nortriptyline hcl]    PHYSICAL EXAM  ED Triage Vitals [03/22/22 1718]   Temp Heart Rate Resp BP SpO2   97.9 °F (36.6 °C) 90 16 137/93 97 %      Temp src Heart Rate Source Patient Position BP Location FiO2 (%)   Oral Monitor -- -- --       Physical Exam  Vitals and nursing note reviewed.   Constitutional:       Comments: Healthy-appearing and comfortably resting on the bed in no acute  distress.  Voice normal.   HENT:      Head: Normocephalic and atraumatic.      Mouth/Throat:      Mouth: Mucous membranes are moist.      Comments: Marked, bilateral, tonsillar hypertrophy, erythema and exudates.  Worse on the right.  No bulging of the peritonsillar tissues.  Eyes:      Conjunctiva/sclera: Conjunctivae normal.   Cardiovascular:      Rate and Rhythm: Normal rate and regular rhythm.      Heart sounds: Normal heart sounds.   Pulmonary:      Effort: Pulmonary effort is normal.      Breath sounds: Normal breath sounds. No stridor.   Neurological:      General: No focal deficit present.      Mental Status: She is alert and oriented to person, place, and time.   Psychiatric:         Mood and Affect: Mood normal.         Behavior: Behavior normal.         LAB RESULTS        I ordered the above labs and reviewed the results    RADIOLOGY  No results found.    I ordered the above radiologic testing and reviewed the results    PROCEDURES  Procedures      PROGRESS AND CONSULTS  ED Course as of 03/22/22 1755 Tue Mar 22, 2022   1748 Patient states that she was not tested for strep in the office.  The chart was reviewed, but I was unable to determine the exact details of her recent visits.  It was explained to the patient that she did appear to have a significant tonsillitis, but that there was no indication for a peritonsillar abscess.  From the patient's description appropriate antibiotic therapy has been instituted and she was reassured.  Treatment plan, expectations and warnings discussed. [ML]      ED Course User Index  [ML] Joey Wilkes MD           MEDICAL DECISION MAKING  Results were reviewed/discussed with the patient and they were also made aware of online access. Pt also made aware that some labs, such as cultures, will not be resulted during ER visit and follow up with PMD is necessary.     MDM       DIAGNOSIS  Final diagnoses:   Tonsillitis with exudate       Latest Documented Vital  Signs:  As of 17:55 EDT  BP- 137/93 HR- 90 Temp- 97.9 °F (36.6 °C) (Oral) O2 sat- 97%    DISPOSITION  Discharged in stable condition       Medication List      New Prescriptions    predniSONE 20 MG tablet  Commonly known as: DELTASONE  Take 1 tablet by mouth 3 (Three) Times a Day.           Where to Get Your Medications      These medications were sent to [address] - [address] [place] - [phone]  - [phone]   [address] [facility] [address]    Phone: [phone]   · predniSONE 20 MG tablet          Follow-up Information     Fabian Philippe MD In 2 days.    Specialty: Family Medicine  Why: If symptoms worsen  Contact information:  [address]  [facility] [address] [phone]                            Joey Wilkes MD  03/22/22 1180

## 2023-03-26 ENCOUNTER — APPOINTMENT (OUTPATIENT)
Dept: CT IMAGING | Facility: HOSPITAL | Age: 48
End: 2023-03-26
Payer: MEDICAID

## 2023-03-26 ENCOUNTER — HOSPITAL ENCOUNTER (EMERGENCY)
Facility: HOSPITAL | Age: 48
Discharge: HOME | End: 2023-03-26
Attending: EMERGENCY MEDICINE | Admitting: EMERGENCY MEDICINE
Payer: MEDICAID

## 2023-03-26 VITALS
TEMPERATURE: 97.9 F | BODY MASS INDEX: 28.82 KG/M2 | HEART RATE: 83 BPM | HEIGHT: 65 IN | DIASTOLIC BLOOD PRESSURE: 91 MMHG | OXYGEN SATURATION: 97 % | SYSTOLIC BLOOD PRESSURE: 173 MMHG | RESPIRATION RATE: 16 BRPM | WEIGHT: 173 LBS

## 2023-03-26 DIAGNOSIS — E87.6 HYPOKALEMIA DUE TO EXCESSIVE GASTROINTESTINAL LOSS OF POTASSIUM: ICD-10-CM

## 2023-03-26 DIAGNOSIS — A08.4 VIRAL GASTROENTERITIS: Primary | ICD-10-CM

## 2023-03-26 LAB
ALBUMIN SERPL-MCNC: 3.9 G/DL (ref 3.5–5.2)
ALBUMIN/GLOB SERPL: 1.4 G/DL
ALP SERPL-CCNC: 85 U/L (ref 39–117)
ALT SERPL W P-5'-P-CCNC: 32 U/L (ref 1–33)
ANION GAP SERPL CALCULATED.3IONS-SCNC: 10.5 MMOL/L (ref 5–15)
AST SERPL-CCNC: 29 U/L (ref 1–32)
BACTERIA UR QL AUTO: ABNORMAL /HPF
BASOPHILS # BLD AUTO: 0.03 10*3/MM3 (ref 0–0.2)
BASOPHILS NFR BLD AUTO: 0.4 % (ref 0–1.5)
BILIRUB SERPL-MCNC: 0.3 MG/DL (ref 0–1.2)
BILIRUB UR QL STRIP: ABNORMAL
BUN SERPL-MCNC: 18 MG/DL (ref 6–20)
BUN/CREAT SERPL: 33.3 (ref 7–25)
CALCIUM SPEC-SCNC: 8.6 MG/DL (ref 8.6–10.5)
CHLORIDE SERPL-SCNC: 104 MMOL/L (ref 98–107)
CLARITY UR: CLEAR
CO2 SERPL-SCNC: 24.5 MMOL/L (ref 22–29)
COLOR UR: YELLOW
CREAT SERPL-MCNC: 0.54 MG/DL (ref 0.57–1)
DEPRECATED RDW RBC AUTO: 42.8 FL (ref 37–54)
EGFRCR SERPLBLD CKD-EPI 2021: 113 ML/MIN/1.73
EOSINOPHIL # BLD AUTO: 0.08 10*3/MM3 (ref 0–0.4)
EOSINOPHIL NFR BLD AUTO: 1 % (ref 0.3–6.2)
ERYTHROCYTE [DISTWIDTH] IN BLOOD BY AUTOMATED COUNT: 13.7 % (ref 12.3–15.4)
GLOBULIN UR ELPH-MCNC: 2.8 GM/DL
GLUCOSE SERPL-MCNC: 99 MG/DL (ref 65–99)
GLUCOSE UR STRIP-MCNC: ABNORMAL MG/DL
HCT VFR BLD AUTO: 39.8 % (ref 34–46.6)
HGB BLD-MCNC: 13.6 G/DL (ref 12–15.9)
HGB UR QL STRIP.AUTO: NEGATIVE
HOLD SPECIMEN: NORMAL
HOLD SPECIMEN: NORMAL
HYALINE CASTS UR QL AUTO: ABNORMAL /LPF
IMM GRANULOCYTES # BLD AUTO: 0.04 10*3/MM3 (ref 0–0.05)
IMM GRANULOCYTES NFR BLD AUTO: 0.5 % (ref 0–0.5)
KETONES UR QL STRIP: NEGATIVE
LEUKOCYTE ESTERASE UR QL STRIP.AUTO: NEGATIVE
LIPASE SERPL-CCNC: 30 U/L (ref 13–60)
LYMPHOCYTES # BLD AUTO: 2.47 10*3/MM3 (ref 0.7–3.1)
LYMPHOCYTES NFR BLD AUTO: 32.1 % (ref 19.6–45.3)
MCH RBC QN AUTO: 29.5 PG (ref 26.6–33)
MCHC RBC AUTO-ENTMCNC: 34.2 G/DL (ref 31.5–35.7)
MCV RBC AUTO: 86.3 FL (ref 79–97)
MONOCYTES # BLD AUTO: 0.49 10*3/MM3 (ref 0.1–0.9)
MONOCYTES NFR BLD AUTO: 6.4 % (ref 5–12)
NEUTROPHILS NFR BLD AUTO: 4.58 10*3/MM3 (ref 1.7–7)
NEUTROPHILS NFR BLD AUTO: 59.6 % (ref 42.7–76)
NITRITE UR QL STRIP: NEGATIVE
NRBC BLD AUTO-RTO: 0 /100 WBC (ref 0–0.2)
PH UR STRIP.AUTO: 7.5 [PH] (ref 4.5–8)
PLATELET # BLD AUTO: 225 10*3/MM3 (ref 140–450)
PMV BLD AUTO: 11.7 FL (ref 6–12)
POTASSIUM SERPL-SCNC: 3.1 MMOL/L (ref 3.5–5.2)
PROT SERPL-MCNC: 6.7 G/DL (ref 6–8.5)
PROT UR QL STRIP: ABNORMAL
RBC # BLD AUTO: 4.61 10*6/MM3 (ref 3.77–5.28)
RBC # UR STRIP: ABNORMAL /HPF
REF LAB TEST METHOD: ABNORMAL
SODIUM SERPL-SCNC: 139 MMOL/L (ref 136–145)
SP GR UR STRIP: 1.02 (ref 1–1.03)
SQUAMOUS #/AREA URNS HPF: ABNORMAL /HPF
UROBILINOGEN UR QL STRIP: ABNORMAL
WBC # UR STRIP: ABNORMAL /HPF
WBC NRBC COR # BLD: 7.69 10*3/MM3 (ref 3.4–10.8)
WHOLE BLOOD HOLD COAG: NORMAL
WHOLE BLOOD HOLD SPECIMEN: NORMAL

## 2023-03-26 PROCEDURE — 25510000001 IOPAMIDOL PER 1 ML: Performed by: EMERGENCY MEDICINE

## 2023-03-26 PROCEDURE — 99283 EMERGENCY DEPT VISIT LOW MDM: CPT

## 2023-03-26 PROCEDURE — 80053 COMPREHEN METABOLIC PANEL: CPT | Performed by: EMERGENCY MEDICINE

## 2023-03-26 PROCEDURE — 85025 COMPLETE CBC W/AUTO DIFF WBC: CPT

## 2023-03-26 PROCEDURE — 83690 ASSAY OF LIPASE: CPT | Performed by: EMERGENCY MEDICINE

## 2023-03-26 PROCEDURE — 0 DIATRIZOATE MEGLUMINE & SODIUM PER 1 ML: Performed by: EMERGENCY MEDICINE

## 2023-03-26 PROCEDURE — 81001 URINALYSIS AUTO W/SCOPE: CPT | Performed by: EMERGENCY MEDICINE

## 2023-03-26 PROCEDURE — 74177 CT ABD & PELVIS W/CONTRAST: CPT

## 2023-03-26 RX ORDER — SODIUM CHLORIDE 0.9 % (FLUSH) 0.9 %
10 SYRINGE (ML) INJECTION AS NEEDED
Status: DISCONTINUED | OUTPATIENT
Start: 2023-03-26 | End: 2023-03-26 | Stop reason: HOSPADM

## 2023-03-26 RX ORDER — POTASSIUM CHLORIDE 20 MEQ/1
20 TABLET, EXTENDED RELEASE ORAL DAILY
Qty: 14 TABLET | Refills: 0 | Status: SHIPPED | OUTPATIENT
Start: 2023-03-26 | End: 2023-04-02

## 2023-03-26 RX ORDER — DIPHENOXYLATE HYDROCHLORIDE AND ATROPINE SULFATE 2.5; .025 MG/1; MG/1
1 TABLET ORAL 4 TIMES DAILY PRN
Qty: 10 TABLET | Refills: 0 | Status: SHIPPED | OUTPATIENT
Start: 2023-03-26 | End: 2023-04-05

## 2023-03-26 RX ADMIN — IOPAMIDOL 100 ML: 755 INJECTION, SOLUTION INTRAVENOUS at 17:12

## 2023-03-26 RX ADMIN — DIATRIZOATE MEGLUMINE AND DIATRIZOATE SODIUM 30 ML: 600; 100 SOLUTION ORAL; RECTAL at 15:45

## 2023-03-26 NOTE — ED NOTES
Pt complaining of lower R sided abd pain x1 week. Describes it as a cramping pain. Pt stated that her PCP started her on Cipro on Monday last week and she is still having diarrhea and the abd pain. Pt stated that she initially was also vomiting but denies vomiting at this time. Pt stated that her stool is black and liquid. Pt reports not having an appetite.

## 2023-03-26 NOTE — DISCHARGE INSTRUCTIONS
Medications as directed.  Plenty of fluids and rest.  Recommend half a Pedialyte and half a Gatorade or Powerade for hydration.  Recommend adding potassium containing foods to your diet.  Follow-up with your PCP as above.  Return to ED for worsening symptoms, medical emergencies.

## 2023-03-26 NOTE — ED PROVIDER NOTES
Subjective   History of Present Illness  Triny Alexandra is a 47-year-old white female who presents to the ED secondary to abdominal plain and diarrhea.  Patient had onset of nausea, vomiting, diarrhea and abdominal pain on 3/20/2023.  She was seen by her PCP who felt she had a gastroenteritis.  She was placed on antibiotics and antinausea medicine.  The vomiting has since resolved.  However patient continued to have lower abdominal pain and diarrhea.  The diarrhea is dark and black.  Patient was seen at [facility] ED 4 days ago for the same symptoms.  Work-up there was unremarkable.  As symptoms continue to today patient elected to come to the ED for evaluation.  She has had 6-7 bouts of diarrhea since 7 AM this morning.  Ongoing pain in the lower abdomen.  Patient had fever and chills when symptoms began.  However this resolved within 48 hours.  Patient presents for evaluation.  Patient was tested for COVID and influenza.  Both these were negative.    History provided by:  Patient      Review of Systems   Constitutional: Positive for fever (As in HPI).   HENT: Negative.  Negative for rhinorrhea.    Eyes: Negative.  Negative for redness.   Respiratory: Negative for cough.    Cardiovascular: Negative for chest pain.   Gastrointestinal: Positive for abdominal pain, diarrhea, nausea and vomiting.   Endocrine: Negative.    Genitourinary: Negative.  Negative for difficulty urinating.   Musculoskeletal: Negative.  Negative for back pain.   Skin: Negative.  Negative for color change.   Neurological: Negative.  Negative for syncope.   Hematological: Negative.    Psychiatric/Behavioral: Negative.    All other systems reviewed and are negative.      Past Medical History:   Diagnosis Date   • Anxiety    • Cervicalgia    • Depression    • Hyperlipidemia    • Insomnia    • RLS (restless legs syndrome)        Allergies   Allergen Reactions   • Lyrica [Pregabalin] Shortness Of Breath   • Imitrex [Sumatriptan]    • Neurontin  [Gabapentin]    • Pamelor [Nortriptyline Hcl]        Past Surgical History:   Procedure Laterality Date   • COLONOSCOPY N/A 6/21/2016    Procedure: COLONOSCOPY; POLYPECTOMY;  Surgeon: Perla Garces DO;  Location:  [facility] OR;  Service:    • COLONOSCOPY W/ POLYPECTOMY N/A 12/14/2021    Procedure: COLONOSCOPY with polypectomy;  Surgeon: Perla Garces DO;  Location:  [facility] OR;  Service: Gastroenterology;  Laterality: N/A;  rectal polyp x6   • HYSTERECTOMY     • LAPAROSCOPIC CHOLECYSTECTOMY     • WRIST SURGERY         Family History   Problem Relation Age of Onset   • Heart attack Father    • Thyroid disease Sister        Social History     Socioeconomic History   • Marital status: Legally    Tobacco Use   • Smoking status: Every Day     Packs/day: 1.00     Years: 15.00     Pack years: 15.00     Types: Cigarettes   • Smokeless tobacco: Never   Vaping Use   • Vaping Use: Never used   Substance and Sexual Activity   • Alcohol use: No   • Drug use: No   • Sexual activity: Defer           Objective   Physical Exam  Vitals and nursing note reviewed.   Constitutional:       General: She is not in acute distress.     Appearance: Normal appearance. She is well-developed. She is not ill-appearing, toxic-appearing or diaphoretic.      Comments: 47-year-old white female in bed.  Patient is a bit overweight.  She otherwise appears in good health.  Vital signs notable for blood pressure 173/91.  Otherwise unremarkable.  Patient friendly and cooperative.     HENT:      Head: Normocephalic and atraumatic.      Right Ear: Tympanic membrane, ear canal and external ear normal.      Left Ear: Tympanic membrane, ear canal and external ear normal.      Nose: Nose normal.      Mouth/Throat:      Mouth: Mucous membranes are moist.      Pharynx: Oropharynx is clear.   Eyes:      Extraocular Movements: Extraocular movements intact.      Conjunctiva/sclera: Conjunctivae normal.      Pupils: Pupils are equal, round, and  reactive to light.   Cardiovascular:      Rate and Rhythm: Normal rate and regular rhythm.      Pulses: Normal pulses.      Heart sounds: Normal heart sounds. No murmur heard.    No friction rub. No gallop.   Pulmonary:      Effort: Pulmonary effort is normal.      Breath sounds: Normal breath sounds.   Abdominal:      General: Abdomen is protuberant. Bowel sounds are normal. There is no distension.      Palpations: Abdomen is soft. There is no mass.      Tenderness: There is abdominal tenderness. There is no guarding or rebound. Positive signs include McBurney's sign and obturator sign. Negative signs include Castillo's sign and Rovsing's sign.      Hernia: No hernia is present.       Musculoskeletal:         General: Normal range of motion.      Cervical back: Normal range of motion and neck supple.   Skin:     General: Skin is warm and dry.      Capillary Refill: Capillary refill takes less than 2 seconds.   Neurological:      General: No focal deficit present.      Mental Status: She is alert and oriented to person, place, and time.      Deep Tendon Reflexes: Reflexes are normal and symmetric.   Psychiatric:         Mood and Affect: Mood normal.         Behavior: Behavior normal.         Procedures           ED Course  ED Course as of 03/26/23 2034   Sun Mar 26, 2023   1528 Potassium(!): 3.1 [SS]   1528 CBC unremarkable.  CMP notable for potassium of 3.1.  Otherwise unremarkable.  Urinalysis shows 100 glucose small bilirubin pleasant.  Protein of 30.  Nitrite and leukocyte negative.  Urine specimen is contaminated with epithelial cells.  No evidence of UTI.  Obtaining CT of the abdomen pelvis as patient has right lower quadrant tenderness.  We will also perform Hemoccult exam. [SS]   1751 Patient is Hemoccult negative.  Normal color stool.  CT shows small renal cyst and ovarian cyst.  Otherwise unremarkable.  I feel patient's symptoms are secondary to a viral gastroenteritis.  Patient still has antinausea  medicines at home.  Will prescribe antidiarrheal medicine.  Also place patient on potassium x1 week secondary to mild hypokalemia.  Discussed at length with patient all results, diagnosis, treatment follow-up.  Will DC home. [SS]      ED Course User Index  [SS] Vciente Larsen MD      Labs Reviewed   COMPREHENSIVE METABOLIC PANEL - Abnormal; Notable for the following components:       Result Value    Creatinine 0.54 (*)     Potassium 3.1 (*)     BUN/Creatinine Ratio 33.3 (*)     All other components within normal limits    Narrative:     GFR Normal >60  Chronic Kidney Disease <60  Kidney Failure <15     URINALYSIS W/ MICROSCOPIC IF INDICATED (NO CULTURE) - Abnormal; Notable for the following components:    Glucose,  mg/dL (Trace) (*)     Bilirubin, UA Small (1+) (*)     Protein, UA 30 mg/dL (1+) (*)     Urobilinogen, UA 4.0 E.U./dL (*)     All other components within normal limits   URINALYSIS, MICROSCOPIC ONLY - Abnormal; Notable for the following components:    WBC, UA 0-2 (*)     Bacteria, UA Trace (*)     Squamous Epithelial Cells, UA 7-12 (*)     All other components within normal limits   LIPASE - Normal   CBC WITH AUTO DIFFERENTIAL - Normal   RAINBOW DRAW    Narrative:     The following orders were created for panel order [place] Draw.  Procedure                               Abnormality         Status                     ---------                               -----------         ------                     Green Top (Gel)[[id number]]                                  Final result               Lavender Top[[id number]]                                     Final result               Gold Top - SST[[id number]]                                   Final result               Light Blue Top[[id number]]                                   Final result                 Please view results for these tests on the individual orders.   CBC AND DIFFERENTIAL    Narrative:     The following orders were created for panel order  CBC & Differential.  Procedure                               Abnormality         Status                     ---------                               -----------         ------                     CBC Auto Differential[[id number]]        Normal              Final result                 Please view results for these tests on the individual orders.   GREEN TOP   LAVENDER TOP   GOLD TOP - SST   LIGHT BLUE TOP     CT Abdomen Pelvis With Contrast    Result Date: 3/26/2023  Narrative: CT Abdomen Pelvis W INDICATION: Right lower quadrant pain diarrhea for one week TECHNIQUE: CT of the abdomen and pelvis with IV contrast. Coronal and sagittal reconstructions were obtained.  Radiation dose reduction techniques included automated exposure control or exposure modulation based on body size. Count of known CT and cardiac nuc med studies performed in previous 12 months: 0. COMPARISON: 12/15/2021 FINDINGS: Abdomen: Lung bases are clear. Gallbladder has been removed. Liver, spleen, pancreas, adrenal glands and kidneys are normal in appearance except for a simple 8 mm cyst in the left kidney. The aorta is normal in size. There is no adenopathy. Oral contrast was administered. Bowel appears normal. Pelvis: The right ovary has a 17 mm cyst. The uterus has been removed. Left ovary is not visible. Bones are unremarkable.     Impression: There is a 17 mm right ovarian cyst. This is probably physiologic.. Correlation with menopausal status recommended.. Follow-up ultrasound in 8-12 weeks could be performed but it may be difficult see the ovary because of previous hysterectomy. No bowel inflammation or obstruction There is good oral contrast in the distal ileum and cecum. The appendix is normal. Signer Name: Thomas Wade MD  Signed: 3/26/2023 5:31 PM  Workstation Name: [facility]-  [facility]    My differential diagnosis for abdominal pain includes but is not limited to:  Gastritis, gastroenteritis, peptic ulcer  disease, GERD, esophageal perforation, acute appendicitis, mesenteric adenitis, Meckel’s diverticulum, epiploic appendagitis, diverticulitis, colon cancer, ulcerative colitis, Crohn’s disease, intussusception, small bowel obstruction, adhesions, ischemic bowel, perforated viscus, ileus, obstipation, biliary colic, cholecystitis, cholelithiasis, Amadeo-Jeovany Peña, hepatitis, pancreatitis, common bile duct obstruction, cholangitis, bile leak, splenic trauma, splenic rupture, splenic infarction, splenic abscess, abdominal wall hematoma, abdominal wall abscess, intra-abdominal abscess, ascites, spontaneous bacterial peritonitis, hernia, UTI, cystitis, prostatitis, ureterolithiasis, urinary obstruction, AAA, myocardial infarction, pneumonia, cancer, porphyria, DKA, medications, sickle cell, viral syndrome, zoster    My differential diagnosis for diarrhea includes but is not limited to viral gastroenteritis, bacterial gastroenteritis, food poisoning, C. Difficile, bacterial infections, viral infections, fungal infections, parasitic infections, COVID-19, toxins and laxative abuse                                       MDM    Final diagnoses:   Viral gastroenteritis   Hypokalemia due to excessive gastrointestinal loss of potassium       ED Disposition  ED Disposition     ED Disposition   Discharge    Condition   Stable    Comment   --             Fabian Philippe MD  [address] [facility] [address] [phone]    Schedule an appointment as soon as possible for a visit in 1 week  for continued or worsened symptoms, Soon[place] if needed         Medication List      New Prescriptions    diphenoxylate-atropine 2.5-0.025 MG per tablet  Commonly known as: Lomotil  Take 1 tablet by mouth 4 (Four) Times a Day As Needed for Diarrhea for up to 10 days.     potassium chloride 20 MEQ CR tablet  Commonly known as: K-DUR,KLOR-CON  Take 1 tablet by mouth Daily for 7 days.           Where to Get Your Medications      These medications were  sent to [facility] [place] [address] - [phone]  - [phone]  [address], [place] [address]    Phone: [phone]   · diphenoxylate-atropine 2.5-0.025 MG per tablet  · potassium chloride 20 MEQ CR tablet          Vicente Larsen MD  03/26/23 2034

## 2024-07-25 ENCOUNTER — HOSPITAL ENCOUNTER (EMERGENCY)
Facility: HOSPITAL | Age: 49
Discharge: HOME | End: 2024-07-25
Attending: STUDENT IN AN ORGANIZED HEALTH CARE EDUCATION/TRAINING PROGRAM
Payer: MEDICAID

## 2024-07-25 ENCOUNTER — APPOINTMENT (OUTPATIENT)
Dept: GENERAL RADIOLOGY | Facility: HOSPITAL | Age: 49
End: 2024-07-25
Payer: MEDICAID

## 2024-07-25 VITALS
HEIGHT: 65 IN | OXYGEN SATURATION: 97 % | BODY MASS INDEX: 28.83 KG/M2 | HEART RATE: 72 BPM | WEIGHT: 173.06 LBS | TEMPERATURE: 98.3 F | DIASTOLIC BLOOD PRESSURE: 92 MMHG | SYSTOLIC BLOOD PRESSURE: 161 MMHG | RESPIRATION RATE: 17 BRPM

## 2024-07-25 DIAGNOSIS — R20.0 NUMBNESS AND TINGLING IN RIGHT HAND: ICD-10-CM

## 2024-07-25 DIAGNOSIS — G56.01 CARPAL TUNNEL SYNDROME OF RIGHT WRIST: Primary | ICD-10-CM

## 2024-07-25 DIAGNOSIS — R20.2 NUMBNESS AND TINGLING IN RIGHT HAND: ICD-10-CM

## 2024-07-25 PROCEDURE — 73130 X-RAY EXAM OF HAND: CPT

## 2024-07-25 PROCEDURE — 99283 EMERGENCY DEPT VISIT LOW MDM: CPT

## 2024-07-25 PROCEDURE — 73110 X-RAY EXAM OF WRIST: CPT

## 2024-07-25 RX ORDER — NAPROXEN 500 MG/1
500 TABLET ORAL 2 TIMES DAILY WITH MEALS
Qty: 20 TABLET | Refills: 0 | Status: SHIPPED | OUTPATIENT
Start: 2024-07-25

## 2024-07-25 RX ORDER — HYDROCODONE BITARTRATE AND ACETAMINOPHEN 7.5; 325 MG/1; MG/1
1 TABLET ORAL ONCE
Status: COMPLETED | OUTPATIENT
Start: 2024-07-25 | End: 2024-07-25

## 2024-07-25 RX ORDER — HYDROCODONE BITARTRATE AND ACETAMINOPHEN 5; 325 MG/1; MG/1
1 TABLET ORAL 4 TIMES DAILY PRN
Qty: 12 TABLET | Refills: 0 | Status: SHIPPED | OUTPATIENT
Start: 2024-07-25

## 2024-07-25 RX ORDER — NAPROXEN 250 MG/1
500 TABLET ORAL ONCE
Status: COMPLETED | OUTPATIENT
Start: 2024-07-25 | End: 2024-07-25

## 2024-07-25 RX ADMIN — NAPROXEN 500 MG: 250 TABLET ORAL at 13:04

## 2024-07-25 RX ADMIN — HYDROCODONE BITARTRATE AND ACETAMINOPHEN 1 TABLET: 7.5; 325 TABLET ORAL at 13:04

## 2024-07-25 NOTE — DISCHARGE INSTRUCTIONS
Rest and increase fluids.  Take medications as directed.  Follow-up with your primary care.  Call [facility] office to discuss an appointment as soon as you can.  Return to the emergency department symptoms get worse or change.

## 2024-07-25 NOTE — ED PROVIDER NOTES
"Subjective   History of Present Illness  48-year-old  overweight female patient presented to the emergency department via private vehicle secondary to right hand and wrist pain with numbness.  Patient states she has been having on and off issues with her right hand and wrist for several years.  She states she sees her primary care when this \"flares up\" and he gives her an injection in her wrist.  She states she went and saw him yesterday after having to move a heavy box at work on Monday.  She states he gave her a shot in her wrist.  She states that the pain has progressively became worse and she is now having some numbness in the right forearm.  He states she does have a wrist splint she wears at work sometimes but does not wear it on a consistent basis.    History provided by:  Medical records and patient      Review of Systems   HENT: Negative.     Respiratory: Negative.     Cardiovascular: Negative.    Gastrointestinal: Negative.    Genitourinary: Negative.    Musculoskeletal:  Positive for arthralgias. Negative for back pain, gait problem, joint swelling, myalgias, neck pain and neck stiffness.   Skin: Negative.    Neurological:  Positive for weakness and numbness. Negative for dizziness, tremors, seizures, syncope, facial asymmetry, speech difficulty, light-headedness and headaches.   Psychiatric/Behavioral: Negative.     All other systems reviewed and are negative.      Past Medical History:   Diagnosis Date    Anxiety     Cervicalgia     Depression     Hyperlipidemia     Insomnia     RLS (restless legs syndrome)        Allergies   Allergen Reactions    Lyrica [Pregabalin] Shortness Of Breath    Imitrex [Sumatriptan]     Neurontin [Gabapentin]     Pamelor [Nortriptyline Hcl]        Past Surgical History:   Procedure Laterality Date    COLONOSCOPY N/A 6/21/2016    Procedure: COLONOSCOPY; POLYPECTOMY;  Surgeon: Perla Garces DO;  Location: [facility];  Service:     COLONOSCOPY W/ POLYPECTOMY N/A " 12/14/2021    Procedure: COLONOSCOPY with polypectomy;  Surgeon: Perla Garces DO;  Location: [facility] OR;  Service: Gastroenterology;  Laterality: N/A;  rectal polyp x6    HYSTERECTOMY      LAPAROSCOPIC CHOLECYSTECTOMY      WRIST SURGERY         Family History   Problem Relation Age of Onset    Heart attack Father     Thyroid disease Sister        Social History     Socioeconomic History    Marital status: Legally    Tobacco Use    Smoking status: Every Day     Current packs/day: 1.00     Average packs/day: 1 pack/day for 15.0 years (15.0 ttl pk-yrs)     Types: Cigarettes    Smokeless tobacco: Never   Vaping Use    Vaping status: Never Used   Substance and Sexual Activity    Alcohol use: No    Drug use: No    Sexual activity: Defer           Objective   Physical Exam  Vitals and nursing note reviewed.   Constitutional:       Appearance: Normal appearance. She is normal weight.   HENT:      Head: Normocephalic and atraumatic.      Right Ear: External ear normal.      Left Ear: External ear normal.      Nose: Nose normal.      Mouth/Throat:      Mouth: Mucous membranes are moist.      Pharynx: Oropharynx is clear.   Eyes:      Extraocular Movements: Extraocular movements intact.      Conjunctiva/sclera: Conjunctivae normal.      Pupils: Pupils are equal, round, and reactive to light.   Cardiovascular:      Rate and Rhythm: Normal rate and regular rhythm.      Pulses: Normal pulses.      Heart sounds: Normal heart sounds.   Pulmonary:      Effort: Pulmonary effort is normal.      Breath sounds: Normal breath sounds.   Abdominal:      General: Bowel sounds are normal.      Palpations: Abdomen is soft.   Musculoskeletal:         General: Tenderness present. Normal range of motion.      Right wrist: Tenderness present. Decreased range of motion.      Left wrist: Normal.      Cervical back: Normal range of motion and neck supple.      Comments: Positive Phalen and Tinel sign on right   Skin:     General:  Skin is warm and dry.      Capillary Refill: Capillary refill takes less than 2 seconds.   Neurological:      General: No focal deficit present.      Mental Status: She is alert and oriented to person, place, and time.      GCS: GCS eye subscore is 4. GCS verbal subscore is 5. GCS motor subscore is 6.      Cranial Nerves: Cranial nerves 2-12 are intact.      Motor: Weakness present.      Coordination: Coordination is intact.      Gait: Gait is intact.      Comments: Decreased  on right hand, positive Phalen's and Tinel sign on right   Psychiatric:         Mood and Affect: Mood normal.         Behavior: Behavior normal.         Thought Content: Thought content normal.         Judgment: Judgment normal.         Procedures           ED Course                                             Medical Decision Making  Differential diagnosis includes fracture, dislocation, sprain, strain, carpal tunnel syndrome, cubital tunnel syndrome, nerve entrapment, and orthopedic changes.    XR Wrist 3+ View Right    Result Date: 7/25/2024  Impression: Early radiographic changes of osteoarthritis primarily involving the DIP joints. Electronically Signed: Juan Pablo Rankin MD  7/25/2024 1:22 PM EDT  Workstation ID: [id number]    XR Hand 3+ View Right    Result Date: 7/25/2024  Impression: Early radiographic changes of osteoarthritis primarily involving the DIP joints. Electronically Signed: Juan Pablo Rankin MD  7/25/2024 1:22 PM EDT  Workstation ID: [id number]      Discussed imaging and assessment findings.  Patient has some osteoarthritis in her DIP joints of the right hand.  No acute changes in the wrist.  Patient on assessment appears to have carpal tunnel syndrome.  Patient will be placed in a static Velcro wrist splint.  She was instructed to wear the splint at all times except when bathing.  Instructed her to do this for the next several days.  Then she may take a break occasionally throughout the day.  We will refer her to [facility] for further evaluation and treatment.  Patient should follow-up with her PCP as well.  Patient understands and agrees to discharge plan.    Problems Addressed:  Carpal tunnel syndrome of right wrist: complicated acute illness or injury  Numbness and tingling in right hand: complicated acute illness or injury    Amount and/or Complexity of Data Reviewed  Radiology: ordered. Decision-making details documented in ED Course.    Risk  Prescription drug management.        Final diagnoses:   Carpal tunnel syndrome of right wrist   Numbness and tingling in right hand       ED Disposition  ED Disposition       ED Disposition   Discharge    Condition   Stable    Comment   --               Fabian Philippe MD  [address]  [facility] [address] [phone]    Schedule an appointment as soon as possible for a visit in 1 week      Fady Gonzalez MD  [address] [id number] [phone]    Call today           Medication List        New Prescriptions      HYDROcodone-acetaminophen 5-325 MG per tablet  Commonly known as: NORCO  Take 1 tablet by mouth 4 (Four) Times a Day As Needed for Moderate Pain or Severe Pain.  Replaces: HYDROcodone-acetaminophen 7.5-325 MG per tablet     naproxen 500 MG tablet  Commonly known as: NAPROSYN  Take 1 tablet by mouth 2 (Two) Times a Day With Meals.            Stop      cyclobenzaprine 10 MG tablet  Commonly known as: FLEXERIL     HYDROcodone-acetaminophen 7.5-325 MG per tablet  Commonly known as: NORCO  Replaced by: HYDROcodone-acetaminophen 5-325 MG per tablet     predniSONE 20 MG tablet  Commonly known as: DELTASONE     rOPINIRole 2 MG tablet  Commonly known as: REQUIP               Where to Get Your Medications        These medications were sent to [place], [place] - [address] [facility] [address] - [phone] [facility] [phone]   [address] [address]      Phone: [phone]   HYDROcodone-acetaminophen 5-325 MG per tablet  naproxen 500 MG  tablet            Adam Smith, APRN  07/25/24 1341

## 2024-09-12 ENCOUNTER — APPOINTMENT (OUTPATIENT)
Dept: CT IMAGING | Facility: HOSPITAL | Age: 49
End: 2024-09-12
Payer: MEDICAID

## 2024-09-12 ENCOUNTER — HOSPITAL ENCOUNTER (EMERGENCY)
Facility: HOSPITAL | Age: 49
Discharge: HOME | End: 2024-09-12
Attending: EMERGENCY MEDICINE | Admitting: EMERGENCY MEDICINE
Payer: MEDICAID

## 2024-09-12 VITALS
WEIGHT: 157 LBS | HEIGHT: 65 IN | BODY MASS INDEX: 26.16 KG/M2 | OXYGEN SATURATION: 93 % | SYSTOLIC BLOOD PRESSURE: 157 MMHG | DIASTOLIC BLOOD PRESSURE: 94 MMHG | RESPIRATION RATE: 20 BRPM | TEMPERATURE: 98.2 F | HEART RATE: 69 BPM

## 2024-09-12 DIAGNOSIS — M54.2 CERVICALGIA: Primary | ICD-10-CM

## 2024-09-12 DIAGNOSIS — J01.40 ACUTE NON-RECURRENT PANSINUSITIS: ICD-10-CM

## 2024-09-12 DIAGNOSIS — R51.9 HEADACHE, UNSPECIFIED HEADACHE TYPE: ICD-10-CM

## 2024-09-12 LAB
ALBUMIN SERPL-MCNC: 4 G/DL (ref 3.5–5.2)
ALBUMIN/GLOB SERPL: 1.5 G/DL
ALP SERPL-CCNC: 98 U/L (ref 39–117)
ALT SERPL W P-5'-P-CCNC: 12 U/L (ref 1–33)
ANION GAP SERPL CALCULATED.3IONS-SCNC: 10.1 MMOL/L (ref 5–15)
AST SERPL-CCNC: 18 U/L (ref 1–32)
BASOPHILS # BLD AUTO: 0.02 10*3/MM3 (ref 0–0.2)
BASOPHILS NFR BLD AUTO: 0.3 % (ref 0–1.5)
BILIRUB SERPL-MCNC: 0.3 MG/DL (ref 0–1.2)
BILIRUB UR QL STRIP: NEGATIVE
BUN SERPL-MCNC: 10 MG/DL (ref 6–20)
BUN/CREAT SERPL: 15.4 (ref 7–25)
CALCIUM SPEC-SCNC: 9.3 MG/DL (ref 8.6–10.5)
CHLORIDE SERPL-SCNC: 106 MMOL/L (ref 98–107)
CLARITY UR: CLEAR
CO2 SERPL-SCNC: 24.9 MMOL/L (ref 22–29)
COLOR UR: YELLOW
CREAT SERPL-MCNC: 0.65 MG/DL (ref 0.57–1)
DEPRECATED RDW RBC AUTO: 45.3 FL (ref 37–54)
EGFRCR SERPLBLD CKD-EPI 2021: 107.4 ML/MIN/1.73
EOSINOPHIL # BLD AUTO: 0.13 10*3/MM3 (ref 0–0.4)
EOSINOPHIL NFR BLD AUTO: 1.9 % (ref 0.3–6.2)
ERYTHROCYTE [DISTWIDTH] IN BLOOD BY AUTOMATED COUNT: 13.6 % (ref 12.3–15.4)
ERYTHROCYTE [SEDIMENTATION RATE] IN BLOOD: 17 MM/HR (ref 0–20)
GLOBULIN UR ELPH-MCNC: 2.7 GM/DL
GLUCOSE SERPL-MCNC: 100 MG/DL (ref 65–99)
GLUCOSE UR STRIP-MCNC: NEGATIVE MG/DL
HCT VFR BLD AUTO: 42.8 % (ref 34–46.6)
HGB BLD-MCNC: 14 G/DL (ref 12–15.9)
HGB UR QL STRIP.AUTO: NEGATIVE
HOLD SPECIMEN: NORMAL
HOLD SPECIMEN: NORMAL
IMM GRANULOCYTES # BLD AUTO: 0.01 10*3/MM3 (ref 0–0.05)
IMM GRANULOCYTES NFR BLD AUTO: 0.1 % (ref 0–0.5)
INR PPP: 0.95 (ref 0.9–1.1)
KETONES UR QL STRIP: NEGATIVE
LEUKOCYTE ESTERASE UR QL STRIP.AUTO: NEGATIVE
LIPASE SERPL-CCNC: 25 U/L (ref 13–60)
LYMPHOCYTES # BLD AUTO: 2.42 10*3/MM3 (ref 0.7–3.1)
LYMPHOCYTES NFR BLD AUTO: 35.4 % (ref 19.6–45.3)
MAGNESIUM SERPL-MCNC: 2 MG/DL (ref 1.6–2.6)
MCH RBC QN AUTO: 29 PG (ref 26.6–33)
MCHC RBC AUTO-ENTMCNC: 32.7 G/DL (ref 31.5–35.7)
MCV RBC AUTO: 88.8 FL (ref 79–97)
MONOCYTES # BLD AUTO: 0.36 10*3/MM3 (ref 0.1–0.9)
MONOCYTES NFR BLD AUTO: 5.3 % (ref 5–12)
NEUTROPHILS NFR BLD AUTO: 3.89 10*3/MM3 (ref 1.7–7)
NEUTROPHILS NFR BLD AUTO: 57 % (ref 42.7–76)
NITRITE UR QL STRIP: NEGATIVE
PH UR STRIP.AUTO: 7 [PH] (ref 4.5–8)
PLATELET # BLD AUTO: 206 10*3/MM3 (ref 140–450)
PMV BLD AUTO: 11.8 FL (ref 6–12)
POTASSIUM SERPL-SCNC: 3.6 MMOL/L (ref 3.5–5.2)
PROT SERPL-MCNC: 6.7 G/DL (ref 6–8.5)
PROT UR QL STRIP: NEGATIVE
PROTHROMBIN TIME: 13.1 SECONDS (ref 12.1–15)
RBC # BLD AUTO: 4.82 10*6/MM3 (ref 3.77–5.28)
SODIUM SERPL-SCNC: 141 MMOL/L (ref 136–145)
SP GR UR STRIP: 1.01 (ref 1–1.03)
UROBILINOGEN UR QL STRIP: NORMAL
WBC NRBC COR # BLD AUTO: 6.83 10*3/MM3 (ref 3.4–10.8)
WHOLE BLOOD HOLD COAG: NORMAL
WHOLE BLOOD HOLD SPECIMEN: NORMAL

## 2024-09-12 PROCEDURE — 83735 ASSAY OF MAGNESIUM: CPT | Performed by: NURSE PRACTITIONER

## 2024-09-12 PROCEDURE — 81003 URINALYSIS AUTO W/O SCOPE: CPT | Performed by: NURSE PRACTITIONER

## 2024-09-12 PROCEDURE — 70496 CT ANGIOGRAPHY HEAD: CPT

## 2024-09-12 PROCEDURE — 25010000002 KETOROLAC TROMETHAMINE PER 15 MG: Performed by: NURSE PRACTITIONER

## 2024-09-12 PROCEDURE — 25010000002 METOCLOPRAMIDE PER 10 MG: Performed by: NURSE PRACTITIONER

## 2024-09-12 PROCEDURE — 72125 CT NECK SPINE W/O DYE: CPT

## 2024-09-12 PROCEDURE — 70450 CT HEAD/BRAIN W/O DYE: CPT

## 2024-09-12 PROCEDURE — 0042T HC CT CEREBRAL PERFUSION W/WO CONTRAST: CPT

## 2024-09-12 PROCEDURE — 25010000002 DIPHENHYDRAMINE PER 50 MG: Performed by: NURSE PRACTITIONER

## 2024-09-12 PROCEDURE — 80053 COMPREHEN METABOLIC PANEL: CPT | Performed by: NURSE PRACTITIONER

## 2024-09-12 PROCEDURE — 96375 TX/PRO/DX INJ NEW DRUG ADDON: CPT

## 2024-09-12 PROCEDURE — 93010 ELECTROCARDIOGRAM REPORT: CPT | Performed by: INTERNAL MEDICINE

## 2024-09-12 PROCEDURE — 96374 THER/PROPH/DIAG INJ IV PUSH: CPT

## 2024-09-12 PROCEDURE — 99285 EMERGENCY DEPT VISIT HI MDM: CPT | Performed by: EMERGENCY MEDICINE

## 2024-09-12 PROCEDURE — 83690 ASSAY OF LIPASE: CPT | Performed by: NURSE PRACTITIONER

## 2024-09-12 PROCEDURE — 70498 CT ANGIOGRAPHY NECK: CPT

## 2024-09-12 PROCEDURE — 25810000003 SODIUM CHLORIDE 0.9 % SOLUTION: Performed by: NURSE PRACTITIONER

## 2024-09-12 PROCEDURE — 25510000001 IOPAMIDOL PER 1 ML: Performed by: EMERGENCY MEDICINE

## 2024-09-12 PROCEDURE — 93005 ELECTROCARDIOGRAM TRACING: CPT | Performed by: NURSE PRACTITIONER

## 2024-09-12 PROCEDURE — 85652 RBC SED RATE AUTOMATED: CPT | Performed by: NURSE PRACTITIONER

## 2024-09-12 PROCEDURE — 85025 COMPLETE CBC W/AUTO DIFF WBC: CPT | Performed by: NURSE PRACTITIONER

## 2024-09-12 PROCEDURE — 85610 PROTHROMBIN TIME: CPT | Performed by: NURSE PRACTITIONER

## 2024-09-12 RX ORDER — ROPINIROLE 4 MG/1
TABLET, FILM COATED ORAL
COMMUNITY

## 2024-09-12 RX ORDER — IOPAMIDOL 755 MG/ML
150 INJECTION, SOLUTION INTRAVASCULAR
Status: COMPLETED | OUTPATIENT
Start: 2024-09-12 | End: 2024-09-12

## 2024-09-12 RX ORDER — AZITHROMYCIN 250 MG/1
TABLET, FILM COATED ORAL
COMMUNITY
Start: 2024-08-27

## 2024-09-12 RX ORDER — METOCLOPRAMIDE HYDROCHLORIDE 5 MG/ML
10 INJECTION INTRAMUSCULAR; INTRAVENOUS ONCE
Status: COMPLETED | OUTPATIENT
Start: 2024-09-12 | End: 2024-09-12

## 2024-09-12 RX ORDER — DIPHENHYDRAMINE HYDROCHLORIDE 50 MG/ML
25 INJECTION INTRAMUSCULAR; INTRAVENOUS ONCE
Status: COMPLETED | OUTPATIENT
Start: 2024-09-12 | End: 2024-09-12

## 2024-09-12 RX ORDER — ATORVASTATIN CALCIUM 20 MG/1
1 TABLET, FILM COATED ORAL DAILY
COMMUNITY

## 2024-09-12 RX ORDER — PANTOPRAZOLE SODIUM 40 MG/1
1 TABLET, DELAYED RELEASE ORAL DAILY
COMMUNITY
Start: 2024-09-12 | End: 2024-10-12

## 2024-09-12 RX ORDER — KETOROLAC TROMETHAMINE 30 MG/ML
30 INJECTION, SOLUTION INTRAMUSCULAR; INTRAVENOUS ONCE
Status: COMPLETED | OUTPATIENT
Start: 2024-09-12 | End: 2024-09-12

## 2024-09-12 RX ORDER — EVOLOCUMAB 140 MG/ML
INJECTION, SOLUTION SUBCUTANEOUS
COMMUNITY
Start: 2024-08-20

## 2024-09-12 RX ORDER — BUTALBITAL, ACETAMINOPHEN AND CAFFEINE 50; 325; 40 MG/1; MG/1; MG/1
1 TABLET ORAL EVERY 6 HOURS PRN
Qty: 10 TABLET | Refills: 0 | Status: SHIPPED | OUTPATIENT
Start: 2024-09-12

## 2024-09-12 RX ORDER — CYCLOBENZAPRINE HCL 10 MG
TABLET ORAL EVERY 8 HOURS SCHEDULED
COMMUNITY

## 2024-09-12 RX ORDER — LABETALOL HYDROCHLORIDE 5 MG/ML
10 INJECTION, SOLUTION INTRAVENOUS ONCE
Status: DISCONTINUED | OUTPATIENT
Start: 2024-09-12 | End: 2024-09-12

## 2024-09-12 RX ORDER — ROSUVASTATIN CALCIUM 10 MG/1
TABLET, COATED ORAL EVERY 24 HOURS
COMMUNITY
Start: 2024-03-22

## 2024-09-12 RX ORDER — ONDANSETRON 4 MG/1
TABLET, FILM COATED ORAL
COMMUNITY
Start: 2024-09-12

## 2024-09-12 RX ORDER — EVOLOCUMAB 140 MG/ML
INJECTION, SOLUTION SUBCUTANEOUS
COMMUNITY

## 2024-09-12 RX ORDER — LISINOPRIL 20 MG/1
1 TABLET ORAL DAILY
COMMUNITY

## 2024-09-12 RX ORDER — HYDROXYZINE HYDROCHLORIDE 25 MG/1
TABLET, FILM COATED ORAL
COMMUNITY
Start: 2024-09-11

## 2024-09-12 RX ORDER — CETIRIZINE HYDROCHLORIDE 10 MG/1
1 TABLET ORAL DAILY
COMMUNITY

## 2024-09-12 RX ORDER — CHOLECALCIFEROL (VITAMIN D3) 125 MCG
CAPSULE ORAL
COMMUNITY
Start: 2024-08-29

## 2024-09-12 RX ORDER — PREDNISONE 10 MG/1
TABLET ORAL
COMMUNITY
Start: 2024-07-24

## 2024-09-12 RX ORDER — NAPROXEN 500 MG/1
500 TABLET ORAL 2 TIMES DAILY PRN
Qty: 14 TABLET | Refills: 0 | Status: SHIPPED | OUTPATIENT
Start: 2024-09-12

## 2024-09-12 RX ORDER — SODIUM CHLORIDE 0.9 % (FLUSH) 0.9 %
10 SYRINGE (ML) INJECTION AS NEEDED
Status: DISCONTINUED | OUTPATIENT
Start: 2024-09-12 | End: 2024-09-12 | Stop reason: HOSPADM

## 2024-09-12 RX ORDER — AMOXICILLIN AND CLAVULANATE POTASSIUM 500; 125 MG/1; 1/1
TABLET, FILM COATED ORAL EVERY 12 HOURS SCHEDULED
COMMUNITY
Start: 2024-05-28

## 2024-09-12 RX ORDER — FOLIC ACID 1 MG/1
1 TABLET ORAL DAILY
COMMUNITY

## 2024-09-12 RX ADMIN — SODIUM CHLORIDE 1000 ML: 9 INJECTION, SOLUTION INTRAVENOUS at 13:47

## 2024-09-12 RX ADMIN — KETOROLAC TROMETHAMINE 30 MG: 30 INJECTION, SOLUTION INTRAMUSCULAR; INTRAVENOUS at 15:03

## 2024-09-12 RX ADMIN — METOCLOPRAMIDE 10 MG: 5 INJECTION, SOLUTION INTRAMUSCULAR; INTRAVENOUS at 13:47

## 2024-09-12 RX ADMIN — DIPHENHYDRAMINE HYDROCHLORIDE 25 MG: 50 INJECTION, SOLUTION INTRAMUSCULAR; INTRAVENOUS at 15:03

## 2024-09-12 RX ADMIN — IOPAMIDOL 150 ML: 755 INJECTION, SOLUTION INTRAVENOUS at 13:50

## 2024-09-12 NOTE — ED PROVIDER NOTES
"Subjective   History of Present Illness  48-year-old  overweight female patient presented to the emergency department with a chief complaint of significant headache.  Patient states that she woke up yesterday morning around 4:30 AM having a headache and then some slight chest pain.  She states she went to work.  She states that around the 3-hour denis of being at work she began to have worsening chest pain and left arm numbness.  She states that due to her symptoms she went to [facility].  She states she was found to be significantly hypertensive.  She states they gave her medication to lower blood pressure.  She states they told her that \"her blood was too thick\" and she had a chest x-ray and then it appears/sounds as if she had a CTA of her chest which was negative.  She states that she is having significant neck and head pain.  She states that she gets very dizzy and weak.  She states she feels as if she is weaker on her left side versus her right.  She states she went and saw her primary care today.  She states that he examined her and told her if her symptoms become severe to go to the ER for further evaluation.  She was given an antibiotic and a pain injection.    History provided by:  Medical records, relative and patient      Review of Systems   Constitutional:  Positive for fatigue. Negative for activity change, appetite change, chills, diaphoresis, fever and unexpected weight change.   Respiratory: Negative.     Cardiovascular:  Positive for chest pain. Negative for palpitations and leg swelling.   Gastrointestinal: Negative.    Genitourinary: Negative.    Musculoskeletal: Negative.    Skin: Negative.    Neurological:  Positive for dizziness, tremors, weakness, light-headedness, numbness and headaches. Negative for seizures, syncope and speech difficulty.   Psychiatric/Behavioral: Negative.     All other systems reviewed and are negative.      Past Medical History:   Diagnosis Date    " Anxiety     Cervicalgia     Depression     Hyperlipidemia     Insomnia     RLS (restless legs syndrome)        Allergies   Allergen Reactions    Lyrica [Pregabalin] Shortness Of Breath    Imitrex [Sumatriptan]     Neurontin [Gabapentin]     Pamelor [Nortriptyline Hcl]        Past Surgical History:   Procedure Laterality Date    COLONOSCOPY N/A 6/21/2016    Procedure: COLONOSCOPY; POLYPECTOMY;  Surgeon: Perla Garces DO;  Location: [facility] OR;  Service:     COLONOSCOPY W/ POLYPECTOMY N/A 12/14/2021    Procedure: COLONOSCOPY with polypectomy;  Surgeon: Perla Garces DO;  Location: [facility] OR;  Service: Gastroenterology;  Laterality: N/A;  rectal polyp x6    HYSTERECTOMY      LAPAROSCOPIC CHOLECYSTECTOMY      WRIST SURGERY         Family History   Problem Relation Age of Onset    Heart attack Father     Thyroid disease Sister        Social History     Socioeconomic History    Marital status: Legally    Tobacco Use    Smoking status: Every Day     Current packs/day: 1.00     Average packs/day: 1 pack/day for 15.0 years (15.0 ttl pk-yrs)     Types: Cigarettes    Smokeless tobacco: Never   Vaping Use    Vaping status: Never Used   Substance and Sexual Activity    Alcohol use: No    Drug use: No    Sexual activity: Defer           Objective   Physical Exam  Vitals and nursing note reviewed.   Constitutional:       Appearance: Normal appearance. She is normal weight.   HENT:      Head: Normocephalic and atraumatic.      Right Ear: External ear normal.      Left Ear: External ear normal.      Nose: Nose normal.      Mouth/Throat:      Mouth: Mucous membranes are moist.      Pharynx: Oropharynx is clear.   Eyes:      Extraocular Movements: Extraocular movements intact.      Conjunctiva/sclera: Conjunctivae normal.      Pupils: Pupils are equal, round, and reactive to light.   Cardiovascular:      Rate and Rhythm: Normal rate and regular rhythm.      Pulses: Normal pulses.      Heart sounds: Normal heart  sounds.   Pulmonary:      Effort: Pulmonary effort is normal.      Breath sounds: Normal breath sounds.   Abdominal:      General: Bowel sounds are normal.      Palpations: Abdomen is soft.   Musculoskeletal:         General: Normal range of motion.      Cervical back: Normal range of motion and neck supple.   Skin:     General: Skin is warm and dry.      Capillary Refill: Capillary refill takes less than 2 seconds.   Neurological:      General: No focal deficit present.      Mental Status: She is alert and oriented to person, place, and time.      GCS: GCS eye subscore is 4. GCS verbal subscore is 5. GCS motor subscore is 6.      Cranial Nerves: Cranial nerves 2-12 are intact.      Sensory: Sensation is intact.      Comments: Nystagmus invoked with field of gaze, positive Hallpike to the right   Psychiatric:         Mood and Affect: Mood normal.         Behavior: Behavior normal.         Thought Content: Thought content normal.         Judgment: Judgment normal.         Procedures           ED Course  ED Course as of 09/12/24 1601   Thu Sep 12, 2024   1400 EKG shows sinus rhythm with a ventricular rate of 76 bpm.  Normal P axis.  WY interval 178 ms, QRS duration 92 ms, QTc interval 454 ms.  No previous EKGs to compare to the EKG was reviewed and interpreted by Dr. Post. [RC]      ED Course User Index  [RC] Adam Smith, FARHAT                          Total (NIH Stroke Scale): 0                  Medical Decision Making  Differential diagnosis includes CVA, TIA, migraine, cervicalgia, cervical radiculopathy, Ménière's disease, hypertensive encephalopathy, subarachnoid hemorrhage, intercranial hemorrhage, benign positional paroxysmal vertigo, otitis media, acute sinusitis, tension headache, cluster headache, and viral illness.    CT Angiogram Head w AI Analysis of LVO    Result Date: 9/12/2024  1.No acute abnormality is identified within the large arteries of the head or neck. 2.No significant stenosis of the  bilateral internal carotid arteries. Electronically Signed: Shayan Molina MD  9/12/2024 2:55 PM EDT  Workstation ID: [id number]    CT Angiogram Neck    Result Date: 9/12/2024  1.No acute abnormality is identified within the large arteries of the head or neck. 2.No significant stenosis of the bilateral internal carotid arteries. Electronically Signed: Shayan Molina MD  9/12/2024 2:55 PM EDT  Workstation ID: [id number]    CT CEREBRAL PERFUSION WITH & WITHOUT CONTRAST    Result Date: 9/12/2024  Impression: There is no core infarct or ischemic penumbra based on rapid parameters. Electronically Signed: Francisco Uribe MD  9/12/2024 2:02 PM EDT  Workstation ID: [id number]    CT Head Without Contrast Stroke Protocol    Result Date: 9/12/2024  Impression: 1.An acute intracranial abnormality is not appreciated. 2.There is some paranasal sinus disease. Patient has had previous sinus surgery. Electronically Signed: Francisco Uribe MD  9/12/2024 2:00 PM EDT  Workstation ID: [id number]    CT Cervical Spine Without Contrast    Result Date: 9/12/2024  Impression: No acute abnormality of the cervical spine. Electronically Signed: Jacobo Love MD  9/12/2024 1:52 PM EDT  Workstation ID: [id number]      Labs Reviewed  COMPREHENSIVE METABOLIC PANEL - Abnormal; Notable for the following components:     Glucose                       100 (*)             All other components within normal limits         Narrative: GFR Normal >60                  Chronic Kidney Disease <60                  Kidney Failure <15                    PROTIME-INR - Normal         Narrative: Therapeutic Ranges for INR: 2.0-3.0 (PT 20-30)                                              2.5-3.5 (PT 25-34)  URINALYSIS W/ MICROSCOPIC IF INDICATED (NO CULTURE) - Normal         Narrative: Urine microscopic not indicated.  CBC WITH AUTO DIFFERENTIAL - Normal  MAGNESIUM - Normal  LIPASE - Normal  SEDIMENTATION RATE - Normal  RAINBOW DRAW         Narrative: The following orders were  created for panel order [place] Draw.                  Procedure                               Abnormality         Status                                     ---------                               -----------         ------                                     Green Top (Gel)[[id number]]                                  Final result                               Lavender Top[[id number]]                                     Final result                               Gold Top - SST[[id number]]                                   Final result                               Light Blue Top[[id number]]                                   Final result                                                 Please view results for these tests on the individual orders.  POCT GLUCOSE FINGERSTICK  CBC AND DIFFERENTIAL         Narrative: The following orders were created for panel order CBC & Differential.                  Procedure                               Abnormality         Status                                     ---------                               -----------         ------                                     CBC Auto Differential[[id number]]        Normal              Final result                                                 Please view results for these tests on the individual orders.  GREEN TOP  LAVENDER TOP  GOLD TOP - SST  LIGHT BLUE TOP     Discussed imaging, laboratory and assessment findings.  Patient has no acute findings on CT exam other than some pansinusitis.  I do feel the patient is having some cervicalgia/cervical genic headache.  She has some straightening of her cervical lordosis.  I do feel that the cervical radiculopathy is secondary to this as well.  She is feeling much better after the medications here in the ER.  Patient will be given a prescription for Fioricet, naproxen, and Flonase Sensimist.  Patient should increase fluids and rest.  She should follow-up with her PCP as discussed.  Patient should  return to the emergency department if her symptoms become severe or life-threatening.  Patient understands and agrees to discharge plan.    Problems Addressed:  Acute non-recurrent pansinusitis: acute illness or injury  Cervicalgia: acute illness or injury  Headache, unspecified headache type: acute illness or injury    Amount and/or Complexity of Data Reviewed  Labs: ordered. Decision-making details documented in ED Course.  Radiology: ordered. Decision-making details documented in ED Course.  ECG/medicine tests: ordered. Decision-making details documented in ED Course.    Risk  OTC drugs.  Prescription drug management.        Final diagnoses:   Cervicalgia   Headache, unspecified headache type   Acute non-recurrent pansinusitis       ED Disposition  ED Disposition       ED Disposition   Discharge    Condition   Stable    Comment   --               Fabian Philippe MD  [address]  [facility] [address] [phone]    Schedule an appointment as soon as possible for a visit       [facility] EMERGENCY DEPARTMENT  [address] [facility] [address] [phone]    If symptoms worsen         Medication List        New Prescriptions      butalbital-acetaminophen-caffeine -40 MG per tablet  Commonly known as: FIORICET, ESGIC  Take 1 tablet by mouth Every 6 (Six) Hours As Needed for Headache.     fluticasone 27.5 MCG/SPRAY nasal spray  Commonly known as: VERAMYST  2 sprays into the nostril(s) as directed by provider Daily.            Changed      naproxen 500 MG tablet  Commonly known as: NAPROSYN  Take 1 tablet by mouth 2 (Two) Times a Day As Needed for Mild Pain.  What changed:   when to take this  reasons to take this               Where to Get Your Medications        These medications were sent to [place], [place] - [address] [phone] [facility] [phone]   [address] [facility] [address]      Phone: [phone]   butalbital-acetaminophen-caffeine -40 MG  per tablet  fluticasone 27.5 MCG/SPRAY nasal spray  naproxen 500 MG tablet            Adam Smith, APRN  09/12/24 7040

## 2024-09-12 NOTE — DISCHARGE INSTRUCTIONS
Rest and increase fluids.  Take medications as directed.  Follow-up with your primary care as scheduled.  Continue your tad sedating/and Zanaflex to help with muscle spasms/neck pain.  return to the emergency department if symptoms get worse or change.

## 2024-09-13 LAB
QT INTERVAL: 403 MS
QTC INTERVAL: 454 MS

## (undated) DEVICE — SAFELINER SUCTION CANISTER 1000CC: Brand: DEROYAL

## (undated) DEVICE — SYR LL 3CC

## (undated) DEVICE — KT ORCA ORCAPOD DISP STRL

## (undated) DEVICE — SPNG GZ WOVN 4X4IN 12PLY 10/BX STRL

## (undated) DEVICE — FRCP BX RADJAW4 NDL 2.8 240CM LG OG BX40

## (undated) DEVICE — ADAPT CLN BIOGUARD AIR/H2O DISP

## (undated) DEVICE — Device

## (undated) DEVICE — BW-412T DISP COMBO CLEANING BRUSH: Brand: SINGLE USE COMBINATION CLEANING BRUSH

## (undated) DEVICE — VIAL FORMALIN CAP 10P 40ML